# Patient Record
Sex: MALE | Race: BLACK OR AFRICAN AMERICAN | Employment: FULL TIME | URBAN - METROPOLITAN AREA
[De-identification: names, ages, dates, MRNs, and addresses within clinical notes are randomized per-mention and may not be internally consistent; named-entity substitution may affect disease eponyms.]

---

## 2017-01-24 LAB
ALBUMIN SERPL-MCNC: 3.6 G/DL (ref 3.5–5.5)
ALBUMIN/CREAT UR: 111 MG/G CREAT (ref 0–30)
ALBUMIN/GLOB SERPL: 1.3 {RATIO} (ref 1.1–2.5)
ALP SERPL-CCNC: 74 IU/L (ref 39–117)
ALT SERPL-CCNC: 14 IU/L (ref 0–44)
AST SERPL-CCNC: 15 IU/L (ref 0–40)
BILIRUB SERPL-MCNC: 0.4 MG/DL (ref 0–1.2)
BUN SERPL-MCNC: 16 MG/DL (ref 6–24)
BUN/CREAT SERPL: 16 (ref 9–20)
CALCIUM SERPL-MCNC: 8.5 MG/DL (ref 8.7–10.2)
CHLORIDE SERPL-SCNC: 104 MMOL/L (ref 96–106)
CHOLEST SERPL-MCNC: 151 MG/DL (ref 100–199)
CO2 SERPL-SCNC: 25 MMOL/L (ref 18–29)
COMMENT: ABNORMAL
CREAT SERPL-MCNC: 1 MG/DL (ref 0.76–1.27)
CREAT UR-MCNC: 187.9 MG/DL
EST. AVERAGE GLUCOSE BLD GHB EST-MCNC: 194 MG/DL
FSH SERPL-ACNC: 1.8 MIU/ML (ref 1.5–12.4)
GLOBULIN SER CALC-MCNC: 2.8 G/DL (ref 1.5–4.5)
GLUCOSE SERPL-MCNC: 115 MG/DL (ref 65–99)
HBA1C MFR BLD: 8.4 % (ref 4.8–5.6)
HDLC SERPL-MCNC: 33 MG/DL
INTERPRETATION, 910389: NORMAL
LDLC SERPL CALC-MCNC: 102 MG/DL (ref 0–99)
LH SERPL-ACNC: 2.4 MIU/ML (ref 1.7–8.6)
MICROALBUMIN UR-MCNC: 208.6 UG/ML
POTASSIUM SERPL-SCNC: 4 MMOL/L (ref 3.5–5.2)
PROT SERPL-MCNC: 6.4 G/DL (ref 6–8.5)
SODIUM SERPL-SCNC: 143 MMOL/L (ref 134–144)
TESTOST SERPL-MCNC: 156 NG/DL (ref 348–1197)
TRIGL SERPL-MCNC: 82 MG/DL (ref 0–149)
TSH SERPL DL<=0.005 MIU/L-ACNC: 1.06 UIU/ML (ref 0.45–4.5)
VLDLC SERPL CALC-MCNC: 16 MG/DL (ref 5–40)

## 2017-01-27 ENCOUNTER — OFFICE VISIT (OUTPATIENT)
Dept: ENDOCRINOLOGY | Age: 47
End: 2017-01-27

## 2017-01-27 VITALS
HEIGHT: 73 IN | HEART RATE: 79 BPM | SYSTOLIC BLOOD PRESSURE: 140 MMHG | TEMPERATURE: 96.9 F | RESPIRATION RATE: 18 BRPM | DIASTOLIC BLOOD PRESSURE: 78 MMHG | BODY MASS INDEX: 41.75 KG/M2 | WEIGHT: 315 LBS

## 2017-01-27 DIAGNOSIS — Z79.4 TYPE 2 DIABETES MELLITUS WITH HYPERGLYCEMIA, WITH LONG-TERM CURRENT USE OF INSULIN (HCC): Primary | ICD-10-CM

## 2017-01-27 DIAGNOSIS — E78.5 HYPERLIPIDEMIA, UNSPECIFIED HYPERLIPIDEMIA TYPE: ICD-10-CM

## 2017-01-27 DIAGNOSIS — Z79.4 UNCONTROLLED TYPE 2 DIABETES MELLITUS WITH HYPERGLYCEMIA, WITH LONG-TERM CURRENT USE OF INSULIN (HCC): ICD-10-CM

## 2017-01-27 DIAGNOSIS — I10 ESSENTIAL HYPERTENSION: ICD-10-CM

## 2017-01-27 DIAGNOSIS — E11.65 TYPE 2 DIABETES MELLITUS WITH HYPERGLYCEMIA, WITH LONG-TERM CURRENT USE OF INSULIN (HCC): Primary | ICD-10-CM

## 2017-01-27 DIAGNOSIS — E23.0 HYPOGONADOTROPIC HYPOGONADISM IN MALE (HCC): ICD-10-CM

## 2017-01-27 DIAGNOSIS — E11.65 UNCONTROLLED TYPE 2 DIABETES MELLITUS WITH HYPERGLYCEMIA, WITH LONG-TERM CURRENT USE OF INSULIN (HCC): ICD-10-CM

## 2017-01-27 DIAGNOSIS — E78.2 MIXED HYPERLIPIDEMIA: ICD-10-CM

## 2017-01-27 RX ORDER — ALBUTEROL SULFATE 90 UG/1
AEROSOL, METERED RESPIRATORY (INHALATION)
Refills: 0 | COMMUNITY
Start: 2017-01-06 | End: 2020-06-18

## 2017-01-27 RX ORDER — INSULIN GLARGINE 100 [IU]/ML
INJECTION, SOLUTION SUBCUTANEOUS
Qty: 75 ML | Refills: 3 | Status: SHIPPED | OUTPATIENT
Start: 2017-01-27 | End: 2018-04-25 | Stop reason: ALTCHOICE

## 2017-01-27 RX ORDER — LISINOPRIL AND HYDROCHLOROTHIAZIDE 20; 25 MG/1; MG/1
1 TABLET ORAL DAILY
Qty: 90 TAB | Refills: 3 | Status: SHIPPED | OUTPATIENT
Start: 2017-01-27 | End: 2018-04-26 | Stop reason: SDUPTHER

## 2017-01-27 NOTE — PATIENT INSTRUCTIONS
Check blood sugars before breakfast,dinner and at bedtime. If the bedtime sugars are less than 100 ,eat a 15 gm snack. Weight and diet control.     Metformin  mg BID     Lantus insulin 80 units at bed time    trulicity 1.5 mg       Additional Humalog  with meals if blood sugars are[de-identified]     150-200 mg   3 units   201-250 mg   6 units   251-300 mg   9 units   301-350 mg   12 units   351-400 mg   15 units

## 2017-01-27 NOTE — PROGRESS NOTES
Isabela Damian is a 48 y.o. male here for   Chief Complaint   Patient presents with    Diabetes     3 mo f/u    Cholesterol Problem     3 mo f/u    Hypertension     3 mo f/u       Functional glucose monitor and record keeping system? - yes  Eye exam within last year? - yes, yesterday  Foot exam within last year? - yes    Lab Results   Component Value Date/Time    Hemoglobin A1c 8.4 01/23/2017 09:57 AM    Hemoglobin A1c (POC) 8.8 10/21/2016 11:00 AM       Wt Readings from Last 3 Encounters:   10/21/16 338 lb 3.2 oz (153.4 kg)   06/30/16 331 lb 9.6 oz (150.4 kg)   03/30/16 320 lb (145.2 kg)     Temp Readings from Last 3 Encounters:   10/21/16 98.5 °F (36.9 °C) (Oral)   06/30/16 97.3 °F (36.3 °C) (Oral)   03/30/16 98.6 °F (37 °C) (Oral)     BP Readings from Last 3 Encounters:   10/21/16 128/68   06/30/16 135/76   03/30/16 126/86     Pulse Readings from Last 3 Encounters:   10/21/16 81   06/30/16 83   03/30/16 92

## 2017-01-27 NOTE — MR AVS SNAPSHOT
Visit Information Date & Time Provider Department Dept. Phone Encounter #  
 1/27/2017  9:30 AM Geoffrey Nassar MD [facility] [phone] [id number] Follow-up Instructions Return in about 3 months (around 4/27/2017). Upcoming Health Maintenance Date Due  
 FOOT EXAM Q1 10/9/1978 EYE EXAM RETINAL OR DILATED Q1 10/9/1978 Pneumococcal 19-64 Medium Risk (1 of 1 - PPSV23) 10/9/1987 DTaP/Tdap/Td series (1 - Tdap) 10/9/1989 INFLUENZA AGE 9 TO ADULT 8/1/2016 HEMOGLOBIN A1C Q6M 7/23/2017 MICROALBUMIN Q1 1/23/2018 LIPID PANEL Q1 1/23/2018 Allergies as of 1/27/2017  Review Complete On: 1/27/2017 By: Geoffrey Nassar MD  
  
 Severity Noted Reaction Type Reactions Shellfish Derived  07/15/2014    Rash Current Immunizations  Never Reviewed No immunizations on file. Not reviewed this visit You Were Diagnosed With   
  
 Codes Comments Type 2 diabetes mellitus with hyperglycemia, with long-term current use of insulin (HCC)    -  Primary ICD-10-CM: E11.65, Z79.4 ICD-9-CM: 250.00, 790.29, V58.67 Mixed hyperlipidemia     ICD-10-CM: E78.2 ICD-9-CM: 272.2 Essential hypertension     ICD-10-CM: I10 
ICD-9-CM: 401.9 Vitals BP Pulse Temp Resp Height(growth percentile) Weight(growth percentile) 140/78 (BP 1 Location: Left arm, BP Patient Position: Sitting) 79 96.9 °F (36.1 °C) (Oral) 18 6' 1\" (1.854 m) 346 lb (156.9 kg) BMI Smoking Status 45.65 kg/m2 Former Smoker Vitals History BMI and BSA Data Body Mass Index Body Surface Area  
 45.65 kg/m 2 2.84 m 2 Preferred Pharmacy Pharmacy Name Phone [address] [facility], [facility] Fidelia Nassar [phone] Your Updated Medication List  
  
   
This list is accurate as of: 1/27/17  9:42 AM.  Always use your most recent med list.  
  
  
  
  
 atorvastatin 10 mg tablet Commonly known as:  LIPITOR Take 1 Tab by mouth nightly. dulaglutide 0.75 mg/0.5 mL sub-q pen Commonly known as:  TRULICITY  
0.5 mL by SubCUTAneous route every seven (7) days. glucose blood VI test strips strip Commonly known as:  Ascensia CONTOUR Test Blood Glucose 3 Times Daily * insulin glargine 100 unit/mL (3 mL) pen Commonly known as:  LANTUS SOLOSTAR Lantus insulin 60 units at bed time * insulin glargine 300 unit/mL (1.5 mL) Inpn Commonly known as:  TOUJEO SOLOSTAR  
60 Units by SubCUTAneous route nightly. insulin lispro 100 unit/mL kwikpen Commonly known as:  HumaLOG KwikPen Inject 15 units breakfast and 15  units before dinner w/SSI. Max Daily Units: 75 Insulin Needles (Disposable) 32 gauge x 5/32\" Ndle Commonly known as:  Radha Pen Needle 3 x daily Lancets Misc Use to test blood glucose 3 times daily  
  
 lisinopril-hydroCHLOROthiazide 20-25 mg per tablet Commonly known as:  Diane Hodgkin Take 1 Tab by mouth daily. metFORMIN  mg tablet Commonly known as:  GLUCOPHAGE XR Take 1 Tab by mouth two (2) times daily (with meals). NAPROSYN 500 mg tablet Generic drug:  naproxen Take 500 mg by mouth two (2) times daily (with meals). PROAIR HFA 90 mcg/actuation inhaler Generic drug:  albuterol  
inhale 2 puffs by mouth every 4 hours * Notice: This list has 2 medication(s) that are the same as other medications prescribed for you. Read the directions carefully, and ask your doctor or other care provider to review them with you. Follow-up Instructions Return in about 3 months (around 4/27/2017). Patient Instructions Check blood sugars before breakfast,dinner and at bedtime. If the bedtime sugars are less than 100 ,eat a 15 gm snack. Weight and diet control. Metformin  mg BID Lantus insulin 80 units at bed time 
 
trulicity 1.5 mg  
 
 
 Additional Humalog  with meals if blood sugars are[de-identified]  
 
150-200 mg   3 units 201-250 mg   6 units 251-300 mg   9 units 301-350 mg   12 units 351-400 mg   15 units Introducing [facility]! Marjan Pham introduces Layar patient portal. Now you can access parts of your medical record, email your doctor's office, and request medication refills online. 1. In your internet browser, go to [email or web address] 2. Click on the First Time User? Click Here link in the Sign In box. You will see the New Member Sign Up page. 3. Enter your Layar Access Code exactly as it appears below. You will not need to use this code after youve completed the sign-up process. If you do not sign up before the expiration date, you must request a new code. · Layar Access Code: [email or web address] Expires: 4/27/2017  9:42 AM 
 
4. Enter the last four digits of your Social Security Number (xxxx) and Date of Birth (mm/dd/yyyy) as indicated and click Submit. You will be taken to the next sign-up page. 5. Create a Layar ID. This will be your Layar login ID and cannot be changed, so think of one that is secure and easy to remember. 6. Create a Layar password. You can change your password at any time. 7. Enter your Password Reset Question and Answer. This can be used at a later time if you forget your password. 8. Enter your e-mail address. You will receive e-mail notification when new information is available in [address] 9. Click Sign Up. You can now view and download portions of your medical record. 10. Click the Download Summary menu link to download a portable copy of your medical information. If you have questions, please visit the Frequently Asked Questions section of the Layar website. Remember, Layar is NOT to be used for urgent needs. For medical emergencies, dial 911. Now available from your iPhone and Android! Please provide this summary of care documentation to your next provider. Your primary care clinician is listed as Tasia Rosario. If you have any questions after today's visit, please call [phone].

## 2017-01-27 NOTE — PROGRESS NOTES
[facility]               Lang Barrett MD        [address] [phone] Fax [phone] ( [facility]) [address] [address] [address] [phone] Fax [phone] ( Monday)          Patient Information  Date:1/28/2017  Name : Nayely Browne 48 y.o.     YOB: 1970         Referred by: hSelli Lauren NP         Chief Complaint   Patient presents with    Diabetes     3 mo f/u    Cholesterol Problem     3 mo f/u    Hypertension     3 mo f/u       History of Present Illness: Nayely Browne is a 48 y.o. male here for follow-up    Type 2 DM on insulin   not taking Humalog 5 times a week      working nights  Less sleep   No log      missing prandial insulin      Day shift - 7 A - 7 PM - 8 AM breakfast , 12 noon lunch , dinner 6 pm  Night shifts -   2 PM - 6 pm - 12 AM     Type 2 diabetes was diagnosed 14 years ago  Hypoglycemia:  None. History of hypertension:  On Benicar/HCT. No chest pain, claudication, shortness of breath. Wt Readings from Last 3 Encounters:   01/27/17 346 lb (156.9 kg)   10/21/16 338 lb 3.2 oz (153.4 kg)   06/30/16 331 lb 9.6 oz (150.4 kg)       BP Readings from Last 3 Encounters:   01/27/17 140/78   10/21/16 128/68   06/30/16 135/76           Past Medical History   Diagnosis Date    HTN (hypertension)     Hyperlipidemia     Type II or unspecified type diabetes mellitus without mention of complication, uncontrolled      Current Outpatient Prescriptions   Medication Sig    PROAIR HFA 90 mcg/actuation inhaler inhale 2 puffs by mouth every 4 hours    metFORMIN ER (GLUCOPHAGE XR) 500 mg tablet Take 1 Tab by mouth two (2) times daily (with meals).  insulin lispro (HUMALOG KWIKPEN) 100 unit/mL kwikpen Inject 15 units breakfast and 15  units before dinner w/SSI.  Max Daily Units: 75 (Patient taking differently: Inject 20 units breakfast and 20  units before dinner w/SSI and 5 units for snacks. Max Daily Units: 75)    atorvastatin (LIPITOR) 10 mg tablet Take 1 Tab by mouth nightly.  Insulin Needles, Disposable, (SAFIA PEN NEEDLE) 32 x 5/32 \" ndle 3 x daily    Lancets misc Use to test blood glucose 3 times daily    glucose blood VI test strips (ASCENSIA CONTOUR) strip Test Blood Glucose 3 Times Daily    dulaglutide (TRULICITY) 1.5 LS/8.5 mL sub-q pen 0.5 mL by SubCUTAneous route every seven (7) days.  lisinopril-hydroCHLOROthiazide (PRINZIDE, ZESTORETIC) 20-25 mg per tablet Take 1 Tab by mouth daily.  insulin glargine (LANTUS SOLOSTAR) 100 unit/mL (3 mL) pen Inject 80 units at bed time    naproxen (NAPROSYN) 500 mg tablet Take 500 mg by mouth two (2) times daily (with meals). No current facility-administered medications for this visit. Allergies   Allergen Reactions    Shellfish Derived Rash         Review of Systems:  -   - Eyes: no blurry vision no double vision  - Cardiovascular: no chest pain ,no palpitations  - Respiratory: no cough no shortness of breath  - Gastrointestinal: no dysphagia no  abdominal pain  - Musculoskeletal: no joint pains no  weakness  - Integumentary: no rashes  - Neurological: no numbness, tingling, no  headaches  -     Physical Examination:   Blood pressure 140/78, pulse 79, temperature 96.9 °F (36.1 °C), temperature source Oral, resp. rate 18, height 6' 1\" (1.854 m), weight 346 lb (156.9 kg). Estimated body mass index is 45.65 kg/(m^2) as calculated from the following:    Height as of this encounter: 6' 1\" (1.854 m). -   Weight as of this encounter: 346 lb (156.9 kg).   - General: pleasant, no distress, good eye contact  - HEENT: no pallor, no periorbital edema, EOMI  - Neck: supple, no thyromegaly  - Cardiovascular: regular, normal rate, normal S1 and S2  - Respiratory: clear to auscultation bilaterally  - Gastrointestinal: soft, nontender, nondistended,  BS +  - Musculoskeletal: + edema, callus , left hammer toe   - Neurological: alert and oriented  - Psychiatric: normal mood and affect  - Skin: color, texture, turgor normal.       Data Reviewed:     [x] Glucose records reviewed. [] See glucose records for details (to be scanned). [] A1C  [x] Reviewed labs    Lab Results   Component Value Date/Time    Hemoglobin A1c 8.4 01/23/2017 09:57 AM    Hemoglobin A1c 9.3 03/08/2016 09:59 AM    Hemoglobin A1c 9.0 08/27/2015 09:39 AM    Glucose 115 01/23/2017 09:57 AM    Glucose  10/21/2016 11:00 AM    Microalb/Creat ratio (ug/mg creat.) 111.0 01/23/2017 09:57 AM    LDL, calculated 102 01/23/2017 09:57 AM    Creatinine 1.00 01/23/2017 09:57 AM      Lab Results   Component Value Date/Time    Cholesterol, total 151 01/23/2017 09:57 AM    HDL Cholesterol 33 01/23/2017 09:57 AM    LDL, calculated 102 01/23/2017 09:57 AM    Triglyceride 82 01/23/2017 09:57 AM     Lab Results   Component Value Date/Time    ALT 14 01/23/2017 09:57 AM    AST 15 01/23/2017 09:57 AM    Alk. phosphatase 74 01/23/2017 09:57 AM    Bilirubin, total 0.4 01/23/2017 09:57 AM     Lab Results   Component Value Date/Time    GFR est  01/23/2017 09:57 AM    GFR est non-AA 89 01/23/2017 09:57 AM    Creatinine 1.00 01/23/2017 09:57 AM    BUN 16 01/23/2017 09:57 AM    Sodium 143 01/23/2017 09:57 AM    Potassium 4.0 01/23/2017 09:57 AM    Chloride 104 01/23/2017 09:57 AM    CO2 25 01/23/2017 09:57 AM      Lab Results   Component Value Date/Time    TSH 1.060 01/23/2017 09:57 AM        Assessment/Plan:     1. Type 2 Diabetes Mellitus with neuropathy,uncontrolled  Metformin  Trulicity    Lantus 80 units at bedtime. Hold Humalog  20 units BID , discussed to take before each meal , 5 units for snacks   Advised to check glucose 3 - 4 times daily  FLU annually ,Pneumovax ,aspirin daily,annual eye exam,microalbumin    2. HTN : Continue current therapy    3. Hyperlipidemia : lifestyle changes discussed. If no improvement statins    4. Obesity:Body mass index is 45.65 kg/(m^2).    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 If the bedtime sugars are less than 100 ,eat a 15 gm snack. Weight and diet control. Metformin  mg BID     Lantus insulin 80 units at bed time    trulicity 1.5 mg       Additional Humalog  with meals if blood sugars are[de-identified]     150-200 mg   3 units   201-250 mg   6 units   251-300 mg   9 units   301-350 mg   12 units   351-400 mg   15 units           Follow-up Disposition:  Return in about 3 months (around 4/27/2017). Thank you for allowing me to participate in the care of this patient. Will Rutledge MD             [address] [facility]               Will Rutledge MD        [address] [address] [phone] Fax [phone] ( [facility])        [address] [facility] [address] [phone] Fax [phone] ( Monday)          Patient Information  Date:1/28/2017  Name : Rosenda Crews 48 y.o.     YOB: 1970         Referred by: Tasia Rosario NP         Chief Complaint   Patient presents with    Diabetes     3 mo f/u    Cholesterol Problem     3 mo f/u    Hypertension     3 mo f/u       History of Present Illness: Rosenda Crews is a 48 y.o. male here for follow-up    Type 2 DM on insulin   Insulin is expensive   working nights  Less sleep   No log      missing prandial insulin      Day shift - 7 A - 7 PM - 8 AM breakfast , 12 noon lunch , dinner 6 pm  Night shifts -   2 PM - 6 pm - 12 AM     Type 2 diabetes was diagnosed 14 years ago  Hypoglycemia:  None. History of hypertension:  On Benicar/HCT. No chest pain, claudication, shortness of breath.           Wt Readings from Last 3 Encounters:   01/27/17 346 lb (156.9 kg)   10/21/16 338 lb 3.2 oz (153.4 kg)   06/30/16 331 lb 9.6 oz (150.4 kg)       BP Readings from Last 3 Encounters:   01/27/17 140/78   10/21/16 128/68   06/30/16 135/76           Past Medical History   Diagnosis Date    HTN (hypertension)     Hyperlipidemia     Type II or unspecified type diabetes mellitus without mention of complication, uncontrolled      Current Outpatient Prescriptions   Medication Sig    PROAIR HFA 90 mcg/actuation inhaler inhale 2 puffs by mouth every 4 hours    metFORMIN ER (GLUCOPHAGE XR) 500 mg tablet Take 1 Tab by mouth two (2) times daily (with meals).  insulin lispro (HUMALOG KWIKPEN) 100 unit/mL kwikpen Inject 15 units breakfast and 15  units before dinner w/SSI. Max Daily Units: 75 (Patient taking differently: Inject 20 units breakfast and 20  units before dinner w/SSI and 5 units for snacks. Max Daily Units: 75)    atorvastatin (LIPITOR) 10 mg tablet Take 1 Tab by mouth nightly.  Insulin Needles, Disposable, (SAFIA PEN NEEDLE) 32 x 5/32 \" ndle 3 x daily    Lancets misc Use to test blood glucose 3 times daily    glucose blood VI test strips (ASCENSIA CONTOUR) strip Test Blood Glucose 3 Times Daily    dulaglutide (TRULICITY) 1.5 KQ/4.2 mL sub-q pen 0.5 mL by SubCUTAneous route every seven (7) days.  lisinopril-hydroCHLOROthiazide (PRINZIDE, ZESTORETIC) 20-25 mg per tablet Take 1 Tab by mouth daily.  insulin glargine (LANTUS SOLOSTAR) 100 unit/mL (3 mL) pen Inject 80 units at bed time    naproxen (NAPROSYN) 500 mg tablet Take 500 mg by mouth two (2) times daily (with meals). No current facility-administered medications for this visit.       Allergies   Allergen Reactions    Shellfish Derived Rash         Review of Systems:  -   - Eyes: no blurry vision no double vision  - Cardiovascular: no chest pain ,no palpitations  - Respiratory: no cough no shortness of breath  - Gastrointestinal: no dysphagia no  abdominal pain  - Musculoskeletal: no joint pains no  weakness  - Integumentary: no rashes  - Neurological: no numbness, tingling, no  headaches  -     Physical Examination:   Blood pressure 140/78, pulse 79, temperature 96.9 °F (36.1 °C), temperature source Oral, resp. rate 18, height 6' 1\" (1.854 m), weight 346 lb (156.9 kg). Estimated body mass index is 45.65 kg/(m^2) as calculated from the following:    Height as of this encounter: 6' 1\" (1.854 m). -   Weight as of this encounter: 346 lb (156.9 kg). - General: pleasant, no distress, good eye contact  - HEENT: no pallor, no periorbital edema, EOMI  - Neck: supple, no thyromegaly  - Cardiovascular: regular, normal rate, normal S1 and S2  - Respiratory: clear to auscultation bilaterally  - Gastrointestinal: soft, nontender, nondistended,  BS +  - Musculoskeletal: + edema, callus , left hammer toe   - Neurological: alert and oriented  - Psychiatric: normal mood and affect  - Skin: color, texture, turgor normal.       Data Reviewed:     [x] Glucose records reviewed. [] See glucose records for details (to be scanned). [] A1C  [x] Reviewed labs    Lab Results   Component Value Date/Time    Hemoglobin A1c 8.4 01/23/2017 09:57 AM    Hemoglobin A1c 9.3 03/08/2016 09:59 AM    Hemoglobin A1c 9.0 08/27/2015 09:39 AM    Glucose 115 01/23/2017 09:57 AM    Glucose  10/21/2016 11:00 AM    Microalb/Creat ratio (ug/mg creat.) 111.0 01/23/2017 09:57 AM    LDL, calculated 102 01/23/2017 09:57 AM    Creatinine 1.00 01/23/2017 09:57 AM      Lab Results   Component Value Date/Time    Cholesterol, total 151 01/23/2017 09:57 AM    HDL Cholesterol 33 01/23/2017 09:57 AM    LDL, calculated 102 01/23/2017 09:57 AM    Triglyceride 82 01/23/2017 09:57 AM     Lab Results   Component Value Date/Time    ALT 14 01/23/2017 09:57 AM    AST 15 01/23/2017 09:57 AM    Alk.  phosphatase 74 01/23/2017 09:57 AM    Bilirubin, total 0.4 01/23/2017 09:57 AM     Lab Results   Component Value Date/Time    GFR est  01/23/2017 09:57 AM    GFR est non-AA 89 01/23/2017 09:57 AM    Creatinine 1.00 01/23/2017 09:57 AM    BUN 16 01/23/2017 09:57 AM    Sodium 143 01/23/2017 09:57 AM    Potassium 4.0 01/23/2017 09:57 AM    Chloride 104 01/23/2017 09:57 AM    CO2 25 01/23/2017 09:57 AM      Lab Results   Component Value Date/Time    TSH 1.060 01/23/2017 09:57 AM        Assessment/Plan:     1. Type 2 Diabetes Mellitus with neuropathy,uncontrolled  Metformin  Trulicity    Lantus 70 units at bedtime. Humalog  20 units BID , discussed to take before each meal , 5 units for snacks   Advised to check glucose 3 - 4 times daily  FLU annually ,Pneumovax ,aspirin daily,annual eye exam,microalbumin    2. HTN : Continue current therapy    3. Hyperlipidemia : lifestyle changes discussed. If no improvement statins    4. Obesity:Body mass index is 45.65 kg/(m^2). Diet discussed    5 ED - cialis , labs         Patient Instructions   Check blood sugars before breakfast,dinner and at bedtime. If the bedtime sugars are less than 100 ,eat a 15 gm snack. Weight and diet control. Metformin  mg BID     Lantus insulin 80 units at bed time    trulicity 1.5 mg       Additional Humalog  with meals if blood sugars are[de-identified]     150-200 mg   3 units   201-250 mg   6 units   251-300 mg   9 units   301-350 mg   12 units   351-400 mg   15 units           Follow-up Disposition:  Return in about 3 months (around 4/27/2017). Thank you for allowing me to participate in the care of this patient.     Devaughn Lala MD

## 2017-01-28 PROBLEM — E23.0 HYPOGONADOTROPIC HYPOGONADISM IN MALE (HCC): Status: ACTIVE | Noted: 2017-01-28

## 2018-03-02 RX ORDER — DULAGLUTIDE 1.5 MG/.5ML
INJECTION, SOLUTION SUBCUTANEOUS
Qty: 12 SYRINGE | Refills: 2 | Status: SHIPPED | OUTPATIENT
Start: 2018-03-02 | End: 2018-09-23 | Stop reason: SDUPTHER

## 2018-04-13 ENCOUNTER — TELEPHONE (OUTPATIENT)
Dept: ENDOCRINOLOGY | Age: 48
End: 2018-04-13

## 2018-04-13 DIAGNOSIS — E11.65 TYPE 2 DIABETES MELLITUS WITH HYPERGLYCEMIA, UNSPECIFIED WHETHER LONG TERM INSULIN USE (HCC): ICD-10-CM

## 2018-04-13 DIAGNOSIS — E29.1 HYPOGONADISM IN MALE: Primary | ICD-10-CM

## 2018-04-24 ENCOUNTER — OFFICE VISIT (OUTPATIENT)
Dept: ENDOCRINOLOGY | Age: 48
End: 2018-04-24

## 2018-04-24 VITALS
OXYGEN SATURATION: 97 % | TEMPERATURE: 97.1 F | RESPIRATION RATE: 18 BRPM | HEIGHT: 73 IN | DIASTOLIC BLOOD PRESSURE: 72 MMHG | SYSTOLIC BLOOD PRESSURE: 140 MMHG | BODY MASS INDEX: 41.75 KG/M2 | WEIGHT: 315 LBS | HEART RATE: 85 BPM

## 2018-04-24 DIAGNOSIS — E11.65 TYPE 2 DIABETES MELLITUS WITH HYPERGLYCEMIA, WITH LONG-TERM CURRENT USE OF INSULIN (HCC): Primary | ICD-10-CM

## 2018-04-24 DIAGNOSIS — Z79.4 TYPE 2 DIABETES MELLITUS WITH HYPERGLYCEMIA, WITH LONG-TERM CURRENT USE OF INSULIN (HCC): Primary | ICD-10-CM

## 2018-04-24 DIAGNOSIS — I10 ESSENTIAL HYPERTENSION: ICD-10-CM

## 2018-04-24 DIAGNOSIS — E78.2 MIXED HYPERLIPIDEMIA: ICD-10-CM

## 2018-04-24 DIAGNOSIS — E23.0 HYPOGONADOTROPIC HYPOGONADISM IN MALE (HCC): ICD-10-CM

## 2018-04-24 PROBLEM — E11.21 TYPE 2 DIABETES WITH NEPHROPATHY (HCC): Status: ACTIVE | Noted: 2018-04-24

## 2018-04-24 RX ORDER — DICLOFENAC SODIUM 75 MG/1
75 TABLET, DELAYED RELEASE ORAL AS NEEDED
COMMUNITY
End: 2020-06-18

## 2018-04-24 NOTE — PROGRESS NOTES
Ira Poole is a 50 y.o. male here for   Chief Complaint   Patient presents with    Diabetes       Functional glucose monitor and record keeping system? - no  Eye exam within last year? - no, need referral  Foot exam within last year? - due    1. Have you been to the ER, urgent care clinic since your last visit? Hospitalized since your last visit? -no    2. Have you seen or consulted any other health care providers outside of the [address] [place] [address] since your last visit?   Include any pap smears or colon screening.-no      Lab Results   Component Value Date/Time    Hemoglobin A1c 7.0 (H) 04/17/2018 09:06 AM    Hemoglobin A1c (POC) 8.8 10/21/2016 11:00 AM       Wt Readings from Last 3 Encounters:   01/27/17 346 lb (156.9 kg)   10/21/16 338 lb 3.2 oz (153.4 kg)   06/30/16 331 lb 9.6 oz (150.4 kg)     Temp Readings from Last 3 Encounters:   01/27/17 96.9 °F (36.1 °C) (Oral)   10/21/16 98.5 °F (36.9 °C) (Oral)   06/30/16 97.3 °F (36.3 °C) (Oral)     BP Readings from Last 3 Encounters:   01/27/17 140/78   10/21/16 128/68   06/30/16 135/76     Pulse Readings from Last 3 Encounters:   01/27/17 79   10/21/16 81   06/30/16 83

## 2018-04-24 NOTE — PROGRESS NOTES
[facility]               Tashia Pennington MD        [address] 78 [phone] Fax [phone] ( FJB-OWN)        [address] [address] [address] [id number] Fax [phone] ( Monday)          Patient Information  Date:4/24/2018  Name : Gildardo Sanchez 50 y.o.     YOB: 1970         Referred by: Gianni Smith NP         Chief Complaint   Patient presents with    Diabetes       History of Present Illness: Gildardo Sanchez is a 50 y.o. male here for follow-up    Type 2 DM on insulin   not taking Humalog 5 times a week      working nights  Less sleep   No log      missing prandial insulin      Day shift - 7 A - 7 PM - 8 AM breakfast , 12 noon lunch , dinner 6 pm  Night shifts -   2 PM - 6 pm - 12 AM     Type 2 diabetes was diagnosed 14 years ago  Hypoglycemia:  None. History of hypertension:  On Benicar/HCT. No chest pain, claudication, shortness of breath. Wt Readings from Last 3 Encounters:   04/24/18 338 lb 9.6 oz (153.6 kg)   01/27/17 346 lb (156.9 kg)   10/21/16 338 lb 3.2 oz (153.4 kg)       BP Readings from Last 3 Encounters:   04/24/18 140/72   01/27/17 140/78   10/21/16 128/68           Past Medical History:   Diagnosis Date    HTN (hypertension)     Hyperlipidemia     Type II or unspecified type diabetes mellitus without mention of complication, uncontrolled      Current Outpatient Prescriptions   Medication Sig    diclofenac EC (VOLTAREN) 75 mg EC tablet Take 75 mg by mouth as needed.  TRULICITY 1.5 JV/4.2 mL sub-q pen INJECT 0.5ML SUBCUTANEOUSLY EVERY 7 DAYS    lisinopril-hydroCHLOROthiazide (PRINZIDE, ZESTORETIC) 20-25 mg per tablet Take 1 Tab by mouth daily.     insulin glargine (LANTUS SOLOSTAR) 100 unit/mL (3 mL) pen Inject 80 units at bed time    Insulin Needles, Disposable, (SAFIA PEN NEEDLE) 32 x 5/32 \" ndle 3 x daily    Lancets misc Use to test blood glucose 3 times daily    PROAIR HFA 90 mcg/actuation inhaler inhale 2 puffs by mouth every 4 hours    metFORMIN ER (GLUCOPHAGE XR) 500 mg tablet Take 1 Tab by mouth two (2) times daily (with meals).  insulin lispro (HUMALOG KWIKPEN) 100 unit/mL kwikpen Inject 15 units breakfast and 15  units before dinner w/SSI. Max Daily Units: 75 (Patient taking differently: Inject 20 units breakfast and 20  units before dinner w/SSI and 5 units for snacks. Max Daily Units: 75)    atorvastatin (LIPITOR) 10 mg tablet Take 1 Tab by mouth nightly.  glucose blood VI test strips (Exercise.comIA CONTOUR) strip Test Blood Glucose 3 Times Daily    naproxen (NAPROSYN) 500 mg tablet Take 500 mg by mouth two (2) times daily (with meals). No current facility-administered medications for this visit. Allergies   Allergen Reactions    Shellfish Derived Rash         Review of Systems:  -   - Eyes: no blurry vision no double vision  - Cardiovascular: no chest pain ,no palpitations  - Respiratory: no cough no shortness of breath  - Gastrointestinal: no dysphagia no  abdominal pain  - Musculoskeletal: no joint pains no  weakness  - Integumentary: no rashes  - Neurological: no numbness, tingling, no  headaches  -     Physical Examination:   Blood pressure 140/72, pulse 85, temperature 97.1 °F (36.2 °C), temperature source Oral, resp. rate 18, height 6' 1\" (1.854 m), weight 338 lb 9.6 oz (153.6 kg), SpO2 97 %. Estimated body mass index is 44.67 kg/(m^2) as calculated from the following:    Height as of this encounter: 6' 1\" (1.854 m). -   Weight as of this encounter: 338 lb 9.6 oz (153.6 kg).   - General: pleasant, no distress, good eye contact  - HEENT: no pallor, no periorbital edema, EOMI  - Neck: supple, no thyromegaly  - Cardiovascular: regular, normal rate, normal S1 and S2  - Respiratory: clear to auscultation bilaterally  - Gastrointestinal: soft, nontender, nondistended,  BS +  - Musculoskeletal: + edema, callus , left hammer toe   - Neurological: alert and oriented  - Psychiatric: normal mood and affect  - Skin: color, texture, turgor normal.       Data Reviewed:     [x] Glucose records reviewed. [] See glucose records for details (to be scanned). [] A1C  [x] Reviewed labs    Lab Results   Component Value Date/Time    Hemoglobin A1c 7.0 (H) 04/17/2018 09:06 AM    Hemoglobin A1c 8.4 (H) 01/23/2017 09:57 AM    Hemoglobin A1c 9.3 (H) 03/08/2016 09:59 AM    Glucose 98 04/17/2018 09:06 AM    Glucose  10/21/2016 11:00 AM    Microalb/Creat ratio (ug/mg creat.) 170.0 (H) 04/17/2018 09:06 AM    LDL, calculated 109 (H) 04/17/2018 09:06 AM    Creatinine 1.01 04/17/2018 09:06 AM      Lab Results   Component Value Date/Time    Cholesterol, total 164 04/17/2018 09:06 AM    HDL Cholesterol 35 (L) 04/17/2018 09:06 AM    LDL, calculated 109 (H) 04/17/2018 09:06 AM    Triglyceride 101 04/17/2018 09:06 AM     Lab Results   Component Value Date/Time    ALT (SGPT) 20 04/17/2018 09:06 AM    AST (SGOT) 22 04/17/2018 09:06 AM    Alk. phosphatase 65 04/17/2018 09:06 AM    Bilirubin, total 0.6 04/17/2018 09:06 AM     Lab Results   Component Value Date/Time    GFR est  04/17/2018 09:06 AM    GFR est non-AA 87 04/17/2018 09:06 AM    Creatinine 1.01 04/17/2018 09:06 AM    BUN 14 04/17/2018 09:06 AM    Sodium 141 04/17/2018 09:06 AM    Potassium 4.2 04/17/2018 09:06 AM    Chloride 104 04/17/2018 09:06 AM    CO2 26 04/17/2018 09:06 AM      Lab Results   Component Value Date/Time    TSH 1.060 01/23/2017 09:57 AM        Assessment/Plan:     1. Type 2 Diabetes Mellitus with neuropathy,uncontrolled  Metformin  Trulicity    Lantus 80 units at bedtime. Hold Humalog  20 units BID , discussed to take before each meal , 5 units for snacks   Advised to check glucose 3 - 4 times daily  FLU annually ,Pneumovax ,aspirin daily,annual eye exam,microalbumin    2. HTN : Continue current therapy    3. Hyperlipidemia : lifestyle changes discussed. If no improvement statins    4. Obesity:Body mass index is 44.67 kg/(m^2). 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 Follow-up Disposition: Not on File    Thank you for allowing me to participate in the care of this patient. Aleksandra Elias MD             [facility]               Aleksandra Elias MD        [address] [phone] Fax [phone] ( EFD-AFT)        [address] [facility] [address] [id number] Fax [phone] ( Monday)          Patient Information  Date:4/24/2018  Name : Anu Wilks 50 y.o.     YOB: 1970         Referred by: Elizabeth Kelly NP         Chief Complaint   Patient presents with    Diabetes       History of Present Illness: Anu Wilks is a 50 y.o. male here for follow-up    Type 2 DM on insulin  He is working nights. Has lost weight  A1c is improved  I do not have any log    Day shift - 7 A - 7 PM - 8 AM breakfast , 12 noon lunch , dinner 6 pm  Night shifts -   2 PM - 6 pm - 12 AM     Type 2 diabetes was diagnosed 14 years ago  Hypoglycemia:  None. No chest pain, claudication, shortness of breath. Wt Readings from Last 3 Encounters:   04/24/18 338 lb 9.6 oz (153.6 kg)   01/27/17 346 lb (156.9 kg)   10/21/16 338 lb 3.2 oz (153.4 kg)       BP Readings from Last 3 Encounters:   04/24/18 140/72   01/27/17 140/78   10/21/16 128/68           Past Medical History:   Diagnosis Date    HTN (hypertension)     Hyperlipidemia     Type II or unspecified type diabetes mellitus without mention of complication, uncontrolled      Current Outpatient Prescriptions   Medication Sig    diclofenac EC (VOLTAREN) 75 mg EC tablet Take 75 mg by mouth as needed.     TRULICITY 1.5 CR/7.6 mL sub-q pen INJECT 0.5ML SUBCUTANEOUSLY EVERY 7 DAYS    lisinopril-hydroCHLOROthiazide (Kelley Day) 20-25 mg per tablet Take 1 Tab by mouth daily.  insulin glargine (LANTUS SOLOSTAR) 100 unit/mL (3 mL) pen Inject 80 units at bed time    Insulin Needles, Disposable, (SAFIA PEN NEEDLE) 32 x 5/32 \" ndle 3 x daily    Lancets misc Use to test blood glucose 3 times daily    PROAIR HFA 90 mcg/actuation inhaler inhale 2 puffs by mouth every 4 hours    metFORMIN ER (GLUCOPHAGE XR) 500 mg tablet Take 1 Tab by mouth two (2) times daily (with meals).  insulin lispro (HUMALOG KWIKPEN) 100 unit/mL kwikpen Inject 15 units breakfast and 15  units before dinner w/SSI. Max Daily Units: 75 (Patient taking differently: Inject 20 units breakfast and 20  units before dinner w/SSI and 5 units for snacks. Max Daily Units: 75)    atorvastatin (LIPITOR) 10 mg tablet Take 1 Tab by mouth nightly.  glucose blood VI test strips (ASCENSIA CONTOUR) strip Test Blood Glucose 3 Times Daily    naproxen (NAPROSYN) 500 mg tablet Take 500 mg by mouth two (2) times daily (with meals). No current facility-administered medications for this visit. Allergies   Allergen Reactions    Shellfish Derived Rash         Review of Systems:  -   - Eyes: no blurry vision no double vision  - Cardiovascular: no chest pain ,no palpitations  - Respiratory: no cough no shortness of breath  - Gastrointestinal: no dysphagia no  abdominal pain  - Musculoskeletal: no joint pains no  weakness  - Integumentary: no rashes  - Neurological: no numbness, tingling, no  headaches  -     Physical Examination:   Blood pressure 140/72, pulse 85, temperature 97.1 °F (36.2 °C), temperature source Oral, resp. rate 18, height 6' 1\" (1.854 m), weight 338 lb 9.6 oz (153.6 kg), SpO2 97 %. Estimated body mass index is 44.67 kg/(m^2) as calculated from the following:    Height as of this encounter: 6' 1\" (1.854 m). -   Weight as of this encounter: 338 lb 9.6 oz (153.6 kg).   - General: pleasant, no distress, good eye contact  - HEENT: no pallor, no periorbital edema, EOMI  - Neck: supple, no thyromegaly  - Cardiovascular: regular, normal rate, normal S1 and S2  - Respiratory: clear to auscultation bilaterally  - Gastrointestinal: soft, nontender, nondistended,  BS +  - Musculoskeletal: No proximal muscle weakness  - Neurological: alert and oriented  - Psychiatric: normal mood and affect  - Skin: color, texture, turgor normal.     Diabetic foot exam: April 2018    Left:     Vibratory sensation absent   Filament test absent sensation with micro filament   Pulse DP: 1+    Deformities: Hammer toe  Right:    Vibratory sensation absent   Filament test absent sensation with micro filament   Pulse DP: 1+   Deformities: None      Data Reviewed:     [x] Glucose records reviewed. [] See glucose records for details (to be scanned). [] A1C  [x] Reviewed labs    Lab Results   Component Value Date/Time    Hemoglobin A1c 7.0 (H) 04/17/2018 09:06 AM    Hemoglobin A1c 8.4 (H) 01/23/2017 09:57 AM    Hemoglobin A1c 9.3 (H) 03/08/2016 09:59 AM    Glucose 98 04/17/2018 09:06 AM    Glucose  10/21/2016 11:00 AM    Microalb/Creat ratio (ug/mg creat.) 170.0 (H) 04/17/2018 09:06 AM    LDL, calculated 109 (H) 04/17/2018 09:06 AM    Creatinine 1.01 04/17/2018 09:06 AM      Lab Results   Component Value Date/Time    Cholesterol, total 164 04/17/2018 09:06 AM    HDL Cholesterol 35 (L) 04/17/2018 09:06 AM    LDL, calculated 109 (H) 04/17/2018 09:06 AM    Triglyceride 101 04/17/2018 09:06 AM     Lab Results   Component Value Date/Time    ALT (SGPT) 20 04/17/2018 09:06 AM    AST (SGOT) 22 04/17/2018 09:06 AM    Alk.  phosphatase 65 04/17/2018 09:06 AM    Bilirubin, total 0.6 04/17/2018 09:06 AM     Lab Results   Component Value Date/Time    GFR est  04/17/2018 09:06 AM    GFR est non-AA 87 04/17/2018 09:06 AM    Creatinine 1.01 04/17/2018 09:06 AM    BUN 14 04/17/2018 09:06 AM    Sodium 141 04/17/2018 09:06 AM    Potassium 4.2 04/17/2018 09:06 AM    Chloride 104 04/17/2018 09:06 AM    CO2 26 04/17/2018 09:06 AM      Lab Results   Component Value Date/Time    TSH 1.060 01/23/2017 09:57 AM        Assessment/Plan:     1. Type 2 Diabetes Mellitus with neuropathy,uncontrolled  Metformin  Trulicity   Tresiba 80 units at bedtime. Advised to check glucose 3 - 4 times daily  FLU annually ,Pneumovax ,aspirin daily,annual eye exam,microalbumin    2. HTN : Continue current therapy    3. Hyperlipidemia : lipitor     4. Obesity:Body mass index is 44.67 kg/(m^2). Diet discussed    5 ED - cialis ,     6. Hypogonadotrophic hypogonadism: Prolactin is very minimally elevated which is not significant  Discussed about obstructive sleep apnea symptoms. Weight loss encouraged. He is motivated to lose weight, 2 years ago he had lost 50-60 pounds. There are no Patient Instructions on file for this visit. Follow-up Disposition: Not on File    Thank you for allowing me to participate in the care of this patient.     Ana M Escalera MD

## 2018-04-24 NOTE — MR AVS SNAPSHOT
[address] [address] 
[phone] Patient: Zina Hawkins MRN: [id number] :1970 Visit Information Date & Time Provider Department Dept. Phone Encounter #  
 2018  3:45 PM Marti Boone MD [facility] [phone] [id number] Follow-up Instructions Return in about 5 months (around 2018). Upcoming Health Maintenance Date Due  
 FOOT EXAM Q1 10/9/1978 Pneumococcal 19-64 Medium Risk (1 of 1 - PPSV23) 10/9/1987 DTaP/Tdap/Td series (1 - Tdap) 10/9/1989 Influenza Age 5 to Adult 2017 EYE EXAM RETINAL OR DILATED Q1 2018 HEMOGLOBIN A1C Q6M 10/17/2018 MICROALBUMIN Q1 2019 LIPID PANEL Q1 2019 Allergies as of 2018  Review Complete On: 2018 By: Marti Boone MD  
  
 Severity Noted Reaction Type Reactions Shellfish Derived  07/15/2014    Rash Current Immunizations  Never Reviewed No immunizations on file. Not reviewed this visit You Were Diagnosed With   
  
 Codes Comments Type 2 diabetes mellitus with hyperglycemia, with long-term current use of insulin (HCC)    -  Primary ICD-10-CM: E11.65, Z79.4 ICD-9-CM: 250.00, 790.29, V58.67 Essential hypertension     ICD-10-CM: I10 
ICD-9-CM: 401.9 Mixed hyperlipidemia     ICD-10-CM: E78.2 ICD-9-CM: 272.2 Hypogonadotropic hypogonadism in male [facility])     ICD-10-CM: E23.0 ICD-9-CM: 253.4 Vitals BP Pulse Temp Resp Height(growth percentile) Weight(growth percentile) 140/72 (BP 1 Location: Left arm, BP Patient Position: Sitting) 85 97.1 °F (36.2 °C) (Oral) 18 6' 1\" (1.854 m) 338 lb 9.6 oz (153.6 kg) SpO2 BMI Smoking Status 97% 44.67 kg/m2 Former Smoker Vitals History BMI and BSA Data Body Mass Index Body Surface Area  
 44.67 kg/m 2 2.81 m 2 Preferred Pharmacy Pharmacy Name Phone [address] [place] [address] Fidelia Nassar [phone] Your Updated Medication List  
  
   
This list is accurate as of 4/24/18  4:26 PM.  Always use your most recent med list.  
  
  
  
  
 atorvastatin 10 mg tablet Commonly known as:  LIPITOR Take 1 Tab by mouth nightly. diclofenac EC 75 mg EC tablet Commonly known as:  VOLTAREN Take 75 mg by mouth as needed. glucose blood VI test strips strip Commonly known as:  Ascensia CONTOUR Test Blood Glucose 3 Times Daily  
  
 insulin glargine 100 unit/mL (3 mL) Inpn Commonly known as:  Nano Sill Inject 80 units at bed time Insulin Needles (Disposable) 32 gauge x 5/32\" Ndle Commonly known as:  Radha Pen Needle 3 x daily Lancets Misc Use to test blood glucose 3 times daily  
  
 lisinopril-hydroCHLOROthiazide 20-25 mg per tablet Commonly known as:  Marlaine Georgi Take 1 Tab by mouth daily. NAPROSYN 500 mg tablet Generic drug:  naproxen Take 500 mg by mouth two (2) times daily (with meals). PROAIR HFA 90 mcg/actuation inhaler Generic drug:  albuterol  
inhale 2 puffs by mouth every 4 hours TRULICITY 1.5 ZE/0.5 mL sub-q pen Generic drug:  dulaglutide INJECT 0.5ML SUBCUTANEOUSLY EVERY 7 DAYS Follow-up Instructions Return in about 5 months (around 9/24/2018). Patient Instructions Check blood sugars before breakfast,dinner and at bedtime. If the bedtime sugars are less than 100 ,eat a 15 gm snack. Weight and diet control. Metformin  mg BID Lantus insulin 80 units at bed time 
 
trulicity 1.5 mg Additional Humalog  with meals if blood sugars are[de-identified]  
 
150-200 mg   3 units 201-250 mg   6 units 251-300 mg   9 units 301-350 mg   12 units 351-400 mg   15 units Introducing [facility]!    
 [facility] introduces [employer] patient portal. Now you can access parts of your medical record, email your doctor's office, and request medication refills online. 1. In your internet browser, go to [email or web address] 2. Click on the First Time User? Click Here link in the Sign In box. You will see the New Member Sign Up page. 3. Enter your [facility]â€™[facility] Access Code exactly as it appears below. You will not need to use this code after youve completed the sign-up process. If you do not sign up before the expiration date, you must request a new code. · [facility]â€™s Access Code: [email or web address] Expires: 7/23/2018  4:26 PM 
 
4. Enter the last four digits of your Social Security Number (xxxx) and Date of Birth (mm/dd/yyyy) as indicated and click Submit. You will be taken to the next sign-up page. 5. Create a [facility]â€™s ID. This will be your [facility] login ID and cannot be changed, so think of one that is secure and easy to remember. 6. Create a [facility] password. You can change your password at any time. 7. Enter your Password Reset Question and Answer. This can be used at a later time if you forget your password. 8. Enter your e-mail address. You will receive e-mail notification when new information is available in [address] 9. Click Sign Up. You can now view and download portions of your medical record. 10. Click the Download Summary menu link to download a portable copy of your medical information. If you have questions, please visit the Frequently Asked Questions section of the [facility] website. Remember, [facility]â€™s is NOT to be used for urgent needs. For medical emergencies, dial 911. Now available from your iPhone and Android! Please provide this summary of care documentation to your next provider. Your primary care clinician is listed as Cristel Alvarez. If you have any questions after today's visit, please call [phone].

## 2018-04-25 DIAGNOSIS — E11.65 TYPE 2 DIABETES MELLITUS WITH HYPERGLYCEMIA, WITH LONG-TERM CURRENT USE OF INSULIN (HCC): Primary | ICD-10-CM

## 2018-04-25 DIAGNOSIS — Z79.4 TYPE 2 DIABETES MELLITUS WITH HYPERGLYCEMIA, WITH LONG-TERM CURRENT USE OF INSULIN (HCC): Primary | ICD-10-CM

## 2018-04-25 RX ORDER — INSULIN DEGLUDEC 200 U/ML
INJECTION, SOLUTION SUBCUTANEOUS
Qty: 54 ML | Refills: 3 | Status: SHIPPED | OUTPATIENT
Start: 2018-04-25 | End: 2019-05-12 | Stop reason: SDUPTHER

## 2018-04-26 DIAGNOSIS — I10 ESSENTIAL HYPERTENSION: ICD-10-CM

## 2018-04-26 DIAGNOSIS — E11.65 UNCONTROLLED TYPE 2 DIABETES MELLITUS WITH HYPERGLYCEMIA, WITH LONG-TERM CURRENT USE OF INSULIN (HCC): ICD-10-CM

## 2018-04-26 DIAGNOSIS — E78.5 HYPERLIPIDEMIA, UNSPECIFIED HYPERLIPIDEMIA TYPE: ICD-10-CM

## 2018-04-26 DIAGNOSIS — Z79.4 UNCONTROLLED TYPE 2 DIABETES MELLITUS WITH HYPERGLYCEMIA, WITH LONG-TERM CURRENT USE OF INSULIN (HCC): ICD-10-CM

## 2018-04-26 RX ORDER — LISINOPRIL AND HYDROCHLOROTHIAZIDE 20; 25 MG/1; MG/1
TABLET ORAL
Qty: 90 TAB | Refills: 2 | Status: SHIPPED | OUTPATIENT
Start: 2018-04-26 | End: 2019-04-08 | Stop reason: SDUPTHER

## 2018-08-17 ENCOUNTER — TELEPHONE (OUTPATIENT)
Dept: ENDOCRINOLOGY | Age: 48
End: 2018-08-17

## 2018-08-17 NOTE — TELEPHONE ENCOUNTER
Patient called needs a letter to see hair removal doctor. Please call with any questions or when letter is ready.  Thank you

## 2018-08-17 NOTE — LETTER
8/20/2018 1:58 PM 
 
Mr. Black Elizondo [id number] [facility] [address] [facility] [phone] To Whom it may concern, 
 
 
Mr Black Elizondo is currently under my care at [place] [address]. Based on an A1C result of 7%, Mr Raman Levy has good control of his blood glucose levels and he can proceed with electrolysis hair removal. 
 
If there are any questions, contact the office at [phone]. Sincerely, Farooq Espinoza MD

## 2018-08-17 NOTE — TELEPHONE ENCOUNTER
Based on last A1C result of 7 he has good control of the sugars, he can proceed with minor surgeries.

## 2018-09-23 RX ORDER — DULAGLUTIDE 1.5 MG/.5ML
INJECTION, SOLUTION SUBCUTANEOUS
Qty: 12 SYRINGE | Refills: 1 | Status: SHIPPED | OUTPATIENT
Start: 2018-09-23 | End: 2019-06-10 | Stop reason: SDUPTHER

## 2019-04-08 DIAGNOSIS — E11.65 UNCONTROLLED TYPE 2 DIABETES MELLITUS WITH HYPERGLYCEMIA, WITH LONG-TERM CURRENT USE OF INSULIN (HCC): ICD-10-CM

## 2019-04-08 DIAGNOSIS — I10 ESSENTIAL HYPERTENSION: ICD-10-CM

## 2019-04-08 DIAGNOSIS — Z79.4 UNCONTROLLED TYPE 2 DIABETES MELLITUS WITH HYPERGLYCEMIA, WITH LONG-TERM CURRENT USE OF INSULIN (HCC): ICD-10-CM

## 2019-04-08 DIAGNOSIS — E78.5 HYPERLIPIDEMIA, UNSPECIFIED HYPERLIPIDEMIA TYPE: ICD-10-CM

## 2019-04-08 RX ORDER — LISINOPRIL AND HYDROCHLOROTHIAZIDE 20; 25 MG/1; MG/1
TABLET ORAL
Qty: 90 TAB | Refills: 3 | Status: SHIPPED | OUTPATIENT
Start: 2019-04-08 | End: 2020-03-12

## 2019-09-23 ENCOUNTER — TELEPHONE (OUTPATIENT)
Dept: ENDOCRINOLOGY | Age: 49
End: 2019-09-23

## 2019-09-23 DIAGNOSIS — E78.2 MIXED HYPERLIPIDEMIA: ICD-10-CM

## 2019-09-23 DIAGNOSIS — Z79.4 TYPE 2 DIABETES MELLITUS WITH HYPERGLYCEMIA, WITH LONG-TERM CURRENT USE OF INSULIN (HCC): Primary | ICD-10-CM

## 2019-09-23 DIAGNOSIS — E11.65 TYPE 2 DIABETES MELLITUS WITH HYPERGLYCEMIA, WITH LONG-TERM CURRENT USE OF INSULIN (HCC): Primary | ICD-10-CM

## 2019-09-23 DIAGNOSIS — I10 ESSENTIAL HYPERTENSION: ICD-10-CM

## 2019-09-23 NOTE — TELEPHONE ENCOUNTER
Patient would like lab slip sent to [facility] they will draw labs there and have faxed back.  [phone]

## 2019-09-23 NOTE — TELEPHONE ENCOUNTER
Order placed for pt per verbal order with read back from Dr. Abbey Brumfield 09/23/19    Lab slips faxed.

## 2019-10-01 LAB
ALBUMIN SERPL-MCNC: 4.1 G/DL (ref 3.5–5.5)
ALBUMIN/CREAT UR: 31.2 MG/G CREAT (ref 0–30)
ALBUMIN/GLOB SERPL: 1.5 {RATIO} (ref 1.2–2.2)
ALP SERPL-CCNC: 101 IU/L (ref 39–117)
ALT SERPL-CCNC: 20 IU/L (ref 0–44)
AST SERPL-CCNC: 19 IU/L (ref 0–40)
BILIRUB SERPL-MCNC: 0.2 MG/DL (ref 0–1.2)
BUN SERPL-MCNC: 27 MG/DL (ref 6–24)
BUN/CREAT SERPL: 22 (ref 9–20)
CALCIUM SERPL-MCNC: 9 MG/DL (ref 8.7–10.2)
CHLORIDE SERPL-SCNC: 104 MMOL/L (ref 96–106)
CHOLEST SERPL-MCNC: 149 MG/DL (ref 100–199)
CO2 SERPL-SCNC: 25 MMOL/L (ref 20–29)
CREAT SERPL-MCNC: 1.21 MG/DL (ref 0.76–1.27)
CREAT UR-MCNC: 137.5 MG/DL
EST. AVERAGE GLUCOSE BLD GHB EST-MCNC: 169 MG/DL
GLOBULIN SER CALC-MCNC: 2.7 G/DL (ref 1.5–4.5)
GLUCOSE SERPL-MCNC: 200 MG/DL (ref 65–99)
HBA1C MFR BLD: 7.5 % (ref 4.8–5.6)
HDLC SERPL-MCNC: 29 MG/DL
INTERPRETATION, 910389: NORMAL
LDLC SERPL CALC-MCNC: 94 MG/DL (ref 0–99)
Lab: NORMAL
MICROALBUMIN UR-MCNC: 42.9 UG/ML
POTASSIUM SERPL-SCNC: 4.9 MMOL/L (ref 3.5–5.2)
PROT SERPL-MCNC: 6.8 G/DL (ref 6–8.5)
SODIUM SERPL-SCNC: 141 MMOL/L (ref 134–144)
TRIGL SERPL-MCNC: 132 MG/DL (ref 0–149)
VLDLC SERPL CALC-MCNC: 26 MG/DL (ref 5–40)

## 2019-11-06 NOTE — PROGRESS NOTES
Silvio Gandhi is a 44 y.o. male here for   Chief Complaint   Patient presents with    Diabetes     LV 4/2018    Hypogonadism       Functional glucose monitor and record keeping system? -not checking  Eye exam within last year? -[facility]   Foot exam within last year? - due    1. Have you been to the ER, urgent care clinic since your last visit? Hospitalized since your last visit? -[facility] in Dec appendectomy    2. Have you seen or consulted any other health care providers outside of the [address] [place] [address] since your last visit?   Include any pap smears or colon screening.-no

## 2019-11-07 ENCOUNTER — OFFICE VISIT (OUTPATIENT)
Dept: ENDOCRINOLOGY | Age: 49
End: 2019-11-07

## 2019-11-07 VITALS
RESPIRATION RATE: 18 BRPM | DIASTOLIC BLOOD PRESSURE: 63 MMHG | HEART RATE: 74 BPM | HEIGHT: 73 IN | WEIGHT: 315 LBS | BODY MASS INDEX: 41.75 KG/M2 | TEMPERATURE: 96.9 F | OXYGEN SATURATION: 97 % | SYSTOLIC BLOOD PRESSURE: 131 MMHG

## 2019-11-07 DIAGNOSIS — I10 ESSENTIAL HYPERTENSION: ICD-10-CM

## 2019-11-07 DIAGNOSIS — E11.65 TYPE 2 DIABETES MELLITUS WITH HYPERGLYCEMIA, WITH LONG-TERM CURRENT USE OF INSULIN (HCC): Primary | ICD-10-CM

## 2019-11-07 DIAGNOSIS — Z79.4 TYPE 2 DIABETES MELLITUS WITH HYPERGLYCEMIA, WITH LONG-TERM CURRENT USE OF INSULIN (HCC): Primary | ICD-10-CM

## 2019-11-07 DIAGNOSIS — E23.0 HYPOGONADOTROPIC HYPOGONADISM IN MALE (HCC): ICD-10-CM

## 2019-11-07 DIAGNOSIS — E78.2 MIXED HYPERLIPIDEMIA: ICD-10-CM

## 2019-11-07 DIAGNOSIS — E78.5 HYPERLIPIDEMIA, UNSPECIFIED HYPERLIPIDEMIA TYPE: ICD-10-CM

## 2019-11-07 DIAGNOSIS — E78.5 HYPERLIPIDEMIA: ICD-10-CM

## 2019-11-07 RX ORDER — INSULIN PUMP SYRINGE, 3 ML
EACH MISCELLANEOUS
Qty: 1 KIT | Refills: 0 | Status: SHIPPED | OUTPATIENT
Start: 2019-11-07

## 2019-11-07 RX ORDER — METFORMIN HYDROCHLORIDE 750 MG/1
750 TABLET, EXTENDED RELEASE ORAL
Qty: 90 TAB | Refills: 3 | Status: SHIPPED | OUTPATIENT
Start: 2019-11-07 | End: 2021-03-02

## 2019-11-07 RX ORDER — ATORVASTATIN CALCIUM 10 MG/1
10 TABLET, FILM COATED ORAL
Qty: 90 TAB | Refills: 3 | Status: SHIPPED | OUTPATIENT
Start: 2019-11-07 | End: 2021-03-02 | Stop reason: SDUPTHER

## 2019-11-07 RX ORDER — LANCETS
EACH MISCELLANEOUS
Qty: 300 EACH | Refills: 3 | Status: SHIPPED | OUTPATIENT
Start: 2019-11-07

## 2019-11-07 NOTE — LETTER
11/7/19 Patient: Kemal Celis YOB: 1970 Date of Visit: 11/7/2019 Joaquin Calles NP 
[address] [address] VIA Facsimile: [phone] Dear Joaquin Calles NP, Thank you for referring Mr. Kemal Celis to [address] [place] [address] for evaluation. My notes for this consultation are attached. If you have questions, please do not hesitate to call me. I look forward to following your patient along with you. Sincerely, Samra Campuzano MD

## 2019-11-07 NOTE — PROGRESS NOTES
Peggy Allen MD          Patient Information  Date:11/7/2019  Name : Tarik Harrison 44 y.o.     YOB: 1970         Referred by: Radha Manzano NP         Chief Complaint   Patient presents with    Diabetes     LV 4/2018    Hypogonadism       History of Present Illness: Tarik Harrison is a 44 y.o. male here for follow-up  He was seen 1-1/2-year ago  Type 2 DM on insulin  Ran out of Trulicity, not checking blood glucose  Diet could be better  Ran out of test strips  Weight has been fluctuating, now gradually gaining weight      Day shift - 7 A - 7 PM - 8 AM breakfast , 12 noon lunch , dinner 6 pm  Night shifts -   2 PM - 6 pm - 12 AM     Type 2 diabetes was diagnosed 14 years ago              Wt Readings from Last 3 Encounters:   11/07/19 339 lb (153.8 kg)   04/24/18 338 lb 9.6 oz (153.6 kg)   01/27/17 346 lb (156.9 kg)       BP Readings from Last 3 Encounters:   11/07/19 131/63   04/24/18 140/72   01/27/17 140/78           Past Medical History:   Diagnosis Date    HTN (hypertension)     Hyperlipidemia     Type II or unspecified type diabetes mellitus without mention of complication, uncontrolled      Current Outpatient Medications   Medication Sig    krill/om-3/dha/epa/phospho/ast (MAXIMUM RED KRILL OMEGA-3 PO) Take 1 Cap by mouth daily.     lisinopril-hydroCHLOROthiazide (PRINZIDE, ZESTORETIC) 20-25 mg per tablet TAKE 1 TABLET BY MOUTH DAILY    Insulin Needles, Disposable, (SAFIA PEN NEEDLE) 32 x 5/32 \" ndle 3 x daily    Lancets misc Use to test blood glucose 3 times daily    glucose blood VI test strips (ASCENSIA CONTOUR) strip Test Blood Glucose 3 Times Daily    TRULICITY 1.5 AJ/4.2 mL sub-q pen INJECT 0.5ML SUBCUTANEOUSLY EVERY 7 DAYS    insulin degludec (TRESIBA FLEXTOUCH U-200) 200 unit/mL (3 mL) inpn INJECT 80 UNITS SUBCUTANEOUSLY AT NIGHT (STOP LANTUS) (Patient taking differently: INJECT 70 UNITS SUBCUTANEOUSLY AT NIGHT (STOP LANTUS))    diclofenac EC (VOLTAREN) 75 mg EC tablet Take 75 mg by mouth as needed.  PROAIR HFA 90 mcg/actuation inhaler inhale 2 puffs by mouth every 4 hours    atorvastatin (LIPITOR) 10 mg tablet Take 1 Tab by mouth nightly.  naproxen (NAPROSYN) 500 mg tablet Take 500 mg by mouth two (2) times daily (with meals). No current facility-administered medications for this visit. Allergies   Allergen Reactions    Shellfish Derived Rash         Review of Systems:  -   - Eyes: no blurry vision no double vision  - Cardiovascular: no chest pain ,no palpitations  - Respiratory: no cough no shortness of breath  - Gastrointestinal: no dysphagia no  abdominal pain  - Musculoskeletal: + Joint pains + weakness  - Integumentary: no rashes  - Neurological: no numbness, tingling, no  headaches  -     Physical Examination:   Blood pressure 131/63, pulse 74, temperature 96.9 °F (36.1 °C), temperature source Oral, resp. rate 18, height 6' 1\" (1.854 m), weight 339 lb (153.8 kg), SpO2 97 %. Estimated body mass index is 44.73 kg/m² as calculated from the following:    Height as of this encounter: 6' 1\" (1.854 m). -   Weight as of this encounter: 339 lb (153.8 kg).   - General: pleasant, no distress, good eye contact  - HEENT: no pallor, no periorbital edema, EOMI  - Neck: supple, no thyromegaly  - Cardiovascular: regular, normal rate, normal S1 and S2  - Respiratory: clear to auscultation bilaterally  - Gastrointestinal: soft, nontender, nondistended,  BS +  - Musculoskeletal: + edema, callus , left hammer toe   - Neurological: alert and oriented  - Psychiatric: normal mood and affect  - Skin: color, texture, turgor normal.     Diabetic foot exam: November 2019    Left:     Vibratory sensation absent   Filament test decreased sensation with micro filament   Pulse DP: 1+    Deformities: Hammertoe  Right:    Vibratory sensation absent   Filament test decreased sensation with micro filament   Pulse DP: 1+   Deformities: Hammertoe, callus      Data Reviewed:       Lab Results   Component Value Date/Time    Hemoglobin A1c 7.5 (H) 09/28/2019 12:00 AM    Hemoglobin A1c 7.0 (H) 04/17/2018 09:06 AM    Hemoglobin A1c 8.4 (H) 01/23/2017 09:57 AM    Glucose 200 (H) 09/28/2019 12:00 AM    Glucose  10/21/2016 11:00 AM    Microalb/Creat ratio (ug/mg creat.) 31.2 (H) 09/28/2019 12:00 AM    LDL, calculated 94 09/28/2019 12:00 AM    Creatinine 1.21 09/28/2019 12:00 AM      Lab Results   Component Value Date/Time    Cholesterol, total 149 09/28/2019 12:00 AM    HDL Cholesterol 29 (L) 09/28/2019 12:00 AM    LDL, calculated 94 09/28/2019 12:00 AM    Triglyceride 132 09/28/2019 12:00 AM     Lab Results   Component Value Date/Time    ALT (SGPT) 20 09/28/2019 12:00 AM    AST (SGOT) 19 09/28/2019 12:00 AM    Alk. phosphatase 101 09/28/2019 12:00 AM    Bilirubin, total 0.2 09/28/2019 12:00 AM     Lab Results   Component Value Date/Time    GFR est AA 80 09/28/2019 12:00 AM    GFR est non-AA 69 09/28/2019 12:00 AM    Creatinine 1.21 09/28/2019 12:00 AM    BUN 27 (H) 09/28/2019 12:00 AM    Sodium 141 09/28/2019 12:00 AM    Potassium 4.9 09/28/2019 12:00 AM    Chloride 104 09/28/2019 12:00 AM    CO2 25 09/28/2019 12:00 AM      Lab Results   Component Value Date/Time    TSH 1.060 01/23/2017 09:57 AM        Assessment/Plan:     1. Type 2 Diabetes Mellitus with neuropathy,uncontrolled  Metformin extended release 750 mg, could not tolerate higher dose  Trulicity   Tresiba    Advised to check glucose 3 - 4 times daily  FLU annually ,Pneumovax ,aspirin daily,annual eye exam,microalbumin    2. HTN : Continue current therapy    3. Hyperlipidemia : lifestyle changes discussed. If no improvement statins    4. Obesity:Body mass index is 44.73 kg/m². Diet discussed    5  Hypogonadism /ED -need to lose weight   Shift worker     Counseled at length    There are no Patient Instructions on file for this visit.       Thank you for allowing me to participate in the care of this patient. Shekhar Dolan MD      Patient verbalized understanding    Voice-recognition software was used to generate this report, which may result in some phonetic-based errors in the grammar and contents. Even though attempts were made to correct all the mistakes, some may have been missed and remained in the body of the report.

## 2020-01-30 ENCOUNTER — APPOINTMENT (OUTPATIENT)
Dept: ENDOCRINOLOGY | Age: 50
End: 2020-01-30

## 2020-01-30 DIAGNOSIS — I10 ESSENTIAL HYPERTENSION: ICD-10-CM

## 2020-01-30 DIAGNOSIS — E78.2 MIXED HYPERLIPIDEMIA: ICD-10-CM

## 2020-01-30 DIAGNOSIS — Z79.4 TYPE 2 DIABETES MELLITUS WITH HYPERGLYCEMIA, WITH LONG-TERM CURRENT USE OF INSULIN (HCC): ICD-10-CM

## 2020-01-30 DIAGNOSIS — E11.65 TYPE 2 DIABETES MELLITUS WITH HYPERGLYCEMIA, WITH LONG-TERM CURRENT USE OF INSULIN (HCC): ICD-10-CM

## 2020-01-31 LAB
ALBUMIN SERPL-MCNC: 4 G/DL (ref 3.8–4.9)
ALBUMIN/CREAT UR: 41 MG/G CREAT (ref 0–29)
ALBUMIN/GLOB SERPL: 1.5 {RATIO} (ref 1.2–2.2)
ALP SERPL-CCNC: 81 IU/L (ref 39–117)
ALT SERPL-CCNC: 18 IU/L (ref 0–44)
AST SERPL-CCNC: 13 IU/L (ref 0–40)
BILIRUB SERPL-MCNC: 0.7 MG/DL (ref 0–1.2)
BUN SERPL-MCNC: 16 MG/DL (ref 6–24)
BUN/CREAT SERPL: 14 (ref 9–20)
CALCIUM SERPL-MCNC: 9.2 MG/DL (ref 8.7–10.2)
CHLORIDE SERPL-SCNC: 103 MMOL/L (ref 96–106)
CHOLEST SERPL-MCNC: 120 MG/DL (ref 100–199)
CO2 SERPL-SCNC: 28 MMOL/L (ref 20–29)
CREAT SERPL-MCNC: 1.17 MG/DL (ref 0.76–1.27)
CREAT UR-MCNC: 107.4 MG/DL
EST. AVERAGE GLUCOSE BLD GHB EST-MCNC: 180 MG/DL
GLOBULIN SER CALC-MCNC: 2.7 G/DL (ref 1.5–4.5)
GLUCOSE SERPL-MCNC: 133 MG/DL (ref 65–99)
HBA1C MFR BLD: 7.9 % (ref 4.8–5.6)
HDLC SERPL-MCNC: 34 MG/DL
INTERPRETATION, 910389: NORMAL
LDLC SERPL CALC-MCNC: 72 MG/DL (ref 0–99)
Lab: NORMAL
MICROALBUMIN UR-MCNC: 43.8 UG/ML
POTASSIUM SERPL-SCNC: 4.6 MMOL/L (ref 3.5–5.2)
PROT SERPL-MCNC: 6.7 G/DL (ref 6–8.5)
SODIUM SERPL-SCNC: 143 MMOL/L (ref 134–144)
TRIGL SERPL-MCNC: 71 MG/DL (ref 0–149)
VLDLC SERPL CALC-MCNC: 14 MG/DL (ref 5–40)

## 2020-02-06 NOTE — PROGRESS NOTES
Nayely Browne is a 44 y.o. male here for   Chief Complaint   Patient presents with    Diabetes    Hypogonadism       1. Have you been to the ER, urgent care clinic since your last visit? Hospitalized since your last visit? -no    2. Have you seen or consulted any other health care providers outside of the [address] [place] [address] since your last visit? Include any pap smears or colon screening. -PCP

## 2020-02-07 ENCOUNTER — OFFICE VISIT (OUTPATIENT)
Dept: ENDOCRINOLOGY | Age: 50
End: 2020-02-07

## 2020-02-07 VITALS
SYSTOLIC BLOOD PRESSURE: 150 MMHG | RESPIRATION RATE: 20 BRPM | HEIGHT: 73 IN | TEMPERATURE: 97.3 F | HEART RATE: 72 BPM | OXYGEN SATURATION: 99 % | BODY MASS INDEX: 41.75 KG/M2 | WEIGHT: 315 LBS | DIASTOLIC BLOOD PRESSURE: 79 MMHG

## 2020-02-07 DIAGNOSIS — E23.0 HYPOGONADOTROPIC HYPOGONADISM IN MALE (HCC): ICD-10-CM

## 2020-02-07 DIAGNOSIS — E11.65 TYPE 2 DIABETES MELLITUS WITH HYPERGLYCEMIA, UNSPECIFIED WHETHER LONG TERM INSULIN USE (HCC): ICD-10-CM

## 2020-02-07 DIAGNOSIS — Z79.4 TYPE 2 DIABETES MELLITUS WITH HYPERGLYCEMIA, WITH LONG-TERM CURRENT USE OF INSULIN (HCC): Primary | ICD-10-CM

## 2020-02-07 DIAGNOSIS — I10 ESSENTIAL HYPERTENSION: ICD-10-CM

## 2020-02-07 DIAGNOSIS — E78.5 HYPERLIPIDEMIA: ICD-10-CM

## 2020-02-07 DIAGNOSIS — E11.65 TYPE 2 DIABETES MELLITUS WITH HYPERGLYCEMIA, WITH LONG-TERM CURRENT USE OF INSULIN (HCC): Primary | ICD-10-CM

## 2020-02-07 DIAGNOSIS — E78.2 MIXED HYPERLIPIDEMIA: ICD-10-CM

## 2020-02-07 RX ORDER — MULTIVITAMIN
1 TABLET ORAL DAILY
COMMUNITY
End: 2020-06-18

## 2020-02-07 RX ORDER — PEN NEEDLE, DIABETIC 31 GX3/16"
NEEDLE, DISPOSABLE MISCELLANEOUS
Qty: 300 PEN NEEDLE | Refills: 3 | Status: SHIPPED | OUTPATIENT
Start: 2020-02-07

## 2020-02-07 NOTE — PROGRESS NOTES
Shanique Mason MD          Patient Information  Date:2/7/2020  Name : Nayely Browne 44 y.o.     YOB: 1970         Referred by: Jayjay Fraire NP         Chief Complaint   Patient presents with    Diabetes    Hypogonadism       History of Present Illness: Nayely Browne is a 44 y.o. male here for follow-up  Last visit November 2019  Type 2 DM on insulin  Weight has been stable  He is taking 70 units of Tresiba, not sure how it got decreased from 80 units  Diet could be better  He is not checking the blood glucose  Could not tolerate metformin and hence not taking      Day shift - 7 A - 7 PM - 8 AM breakfast , 12 noon lunch , dinner 6 pm  Night shifts -   2 PM - 6 pm - 12 AM     Type 2 diabetes was diagnosed 14 years ago              Wt Readings from Last 3 Encounters:   02/07/20 334 lb (151.5 kg)   11/07/19 339 lb (153.8 kg)   04/24/18 338 lb 9.6 oz (153.6 kg)       BP Readings from Last 3 Encounters:   02/07/20 150/79   11/07/19 131/63   04/24/18 140/72           Past Medical History:   Diagnosis Date    HTN (hypertension)     Hyperlipidemia     Type II or unspecified type diabetes mellitus without mention of complication, uncontrolled      Current Outpatient Medications   Medication Sig    CHROMIUM PICOLINATE PO Take 1 Tab by mouth daily.  cinnamon bark (CINNAMON) 500 mg cap Take 1 Tab by mouth daily.  TURMERIC PO Take 1 Tab by mouth daily.  dulaglutide (TRULICITY) 1.5 UE/9.7 mL sub-q pen INJECT 0.5ML SUBCUTANEOUSLY EVERY 7 DAYS    atorvastatin (LIPITOR) 10 mg tablet Take 1 Tab by mouth nightly.  lancets misc Use to test blood glucose 3 times daily. Dx code E11.65    glucose blood VI test strips (BLOOD GLUCOSE TEST) strip Use to check BG 3 x daily. E11.65    Blood-Glucose Meter monitoring kit Use to check BG 3 x daily.  Dx code E11.65    insulin degludec (TRESIBA FLEXTOUCH U-200) 200 unit/mL (3 mL) inpn INJECT 80 UNITS SUBCUTANEOUSLY AT NIGHT (STOP LANTUS) (Patient taking differently: INJECT 70 UNITS SUBCUTANEOUSLY AT NIGHT (STOP LANTUS))    lisinopril-hydroCHLOROthiazide (PRINZIDE, ZESTORETIC) 20-25 mg per tablet TAKE 1 TABLET BY MOUTH DAILY    Insulin Needles, Disposable, (SAFIA PEN NEEDLE) 32 x 5/32 \" ndle 3 x daily    glucose blood VI test strips (ASCENSIA CONTOUR) strip Test Blood Glucose 3 Times Daily    krill/om-3/dha/epa/phospho/ast (MAXIMUM RED KRILL OMEGA-3 PO) Take 1 Cap by mouth daily.  metFORMIN ER (GLUCOPHAGE XR) 750 mg tablet Take 1 Tab by mouth Daily (before dinner).  diclofenac EC (VOLTAREN) 75 mg EC tablet Take 75 mg by mouth as needed.  PROAIR HFA 90 mcg/actuation inhaler inhale 2 puffs by mouth every 4 hours    naproxen (NAPROSYN) 500 mg tablet Take 500 mg by mouth two (2) times daily (with meals). No current facility-administered medications for this visit. Allergies   Allergen Reactions    Shellfish Derived Rash         Review of Systems:  -   - Eyes: no blurry vision no double vision  - Cardiovascular: no chest pain ,no palpitations  - Respiratory: no cough no shortness of breath  - Gastrointestinal: no dysphagia no  abdominal pain  - Musculoskeletal: + Joint pains + weakness  - Integumentary: no rashes  - Neurological: no numbness, tingling, no  headaches  -     Physical Examination:   Blood pressure 150/79, pulse 72, temperature 97.3 °F (36.3 °C), temperature source Oral, resp. rate 20, height 6' 1\" (1.854 m), weight 334 lb (151.5 kg), SpO2 99 %. Estimated body mass index is 44.07 kg/m² as calculated from the following:    Height as of this encounter: 6' 1\" (1.854 m). -   Weight as of this encounter: 334 lb (151.5 kg).   - General: pleasant, no distress, good eye contact  - HEENT: no pallor, no periorbital edema, EOMI  - Neck: supple,  - Cardiovascular: regular, normal rate, normal S1 and S2  - Respiratory: clear to auscultation bilaterally  - Gastrointestinal: soft, nontender, nondistended,  BS +  - Musculoskeletal: + edema, callus , left hammer toe   - Neurological: alert and oriented  - Psychiatric: normal mood and affect  - Skin: color, texture, turgor normal.     Diabetic foot exam: November 2019    Left:     Vibratory sensation absent   Filament test decreased sensation with micro filament   Pulse DP: 1+    Deformities: Hammertoe  Right:    Vibratory sensation absent   Filament test decreased sensation with micro filament   Pulse DP: 1+   Deformities: Hammertoe, callus      Data Reviewed:       Lab Results   Component Value Date/Time    Hemoglobin A1c 7.9 (H) 01/30/2020 12:00 AM    Hemoglobin A1c 7.5 (H) 09/28/2019 12:00 AM    Hemoglobin A1c 7.0 (H) 04/17/2018 09:06 AM    Glucose 133 (H) 01/30/2020 12:00 AM    Glucose  10/21/2016 11:00 AM    Microalb/Creat ratio (ug/mg creat.) 41 (H) 01/30/2020 12:00 AM    LDL, calculated 72 01/30/2020 12:00 AM    Creatinine 1.17 01/30/2020 12:00 AM      Lab Results   Component Value Date/Time    Cholesterol, total 120 01/30/2020 12:00 AM    HDL Cholesterol 34 (L) 01/30/2020 12:00 AM    LDL, calculated 72 01/30/2020 12:00 AM    Triglyceride 71 01/30/2020 12:00 AM     Lab Results   Component Value Date/Time    ALT (SGPT) 18 01/30/2020 12:00 AM    AST (SGOT) 13 01/30/2020 12:00 AM    Alk. phosphatase 81 01/30/2020 12:00 AM    Bilirubin, total 0.7 01/30/2020 12:00 AM     Lab Results   Component Value Date/Time    GFR est AA 83 01/30/2020 12:00 AM    GFR est non-AA 72 01/30/2020 12:00 AM    Creatinine 1.17 01/30/2020 12:00 AM    BUN 16 01/30/2020 12:00 AM    Sodium 143 01/30/2020 12:00 AM    Potassium 4.6 01/30/2020 12:00 AM    Chloride 103 01/30/2020 12:00 AM    CO2 28 01/30/2020 12:00 AM      Lab Results   Component Value Date/Time    TSH 1.060 01/23/2017 09:57 AM        Assessment/Plan:     1.  Type 2 Diabetes Mellitus with neuropathy,uncontrolled  Metformin extended release 750 mg, could not tolerate higher dose, agreed to try 750 mg again  [place], if you cannot tolerate Metformin increase to 80 units     Advised to check glucose 3 - 4 times daily  FLU annually ,Pneumovax ,aspirin daily,annual eye exam,microalbumin    2. HTN : Continue current therapy    3. Hyperlipidemia : Statin    4. Obesity:Body mass index is 44.07 kg/m². Diet discussed    5  Hypogonadism /ED -need to lose weight   Shift worker     Counseled at length    There are no Patient Instructions on file for this visit. Thank you for allowing me to participate in the care of this patient. Yessy Iraheta MD      Patient verbalized understanding    Voice-recognition software was used to generate this report, which may result in some phonetic-based errors in the grammar and contents. Even though attempts were made to correct all the mistakes, some may have been missed and remained in the body of the report.

## 2020-02-07 NOTE — PATIENT INSTRUCTIONS
Check blood sugars before breakfast,dinner and at bedtime. If the bedtime sugars are less than 100 ,eat a 15 gm snack. Weight and diet control.     Metformin      Lantus insulin 80 units at bed time    trulicity 1.5 mg       Additional Humalog  with meals if blood sugars are[de-identified]     150-200 mg   3 units   201-250 mg   6 units   251-300 mg   9 units   301-350 mg   12 units   351-400 mg   15 units

## 2020-02-07 NOTE — LETTER
2/7/20 Patient: Angela Parker YOB: 1970 Date of Visit: 2/7/2020 Lyudmila Espinoza NP 
[address] [place] [place] [address] VIA Facsimile: [phone] Dear Lyudmila Espinoza NP, Thank you for referring Mr. Angela Parker to [address] [place] [address] for evaluation. My notes for this consultation are attached. If you have questions, please do not hesitate to call me. I look forward to following your patient along with you. Sincerely, Jeovanny Swenson MD

## 2020-03-12 DIAGNOSIS — Z79.4 UNCONTROLLED TYPE 2 DIABETES MELLITUS WITH HYPERGLYCEMIA, WITH LONG-TERM CURRENT USE OF INSULIN (HCC): ICD-10-CM

## 2020-03-12 DIAGNOSIS — E11.65 UNCONTROLLED TYPE 2 DIABETES MELLITUS WITH HYPERGLYCEMIA, WITH LONG-TERM CURRENT USE OF INSULIN (HCC): ICD-10-CM

## 2020-03-12 DIAGNOSIS — I10 ESSENTIAL HYPERTENSION: ICD-10-CM

## 2020-03-12 DIAGNOSIS — E78.5 HYPERLIPIDEMIA, UNSPECIFIED HYPERLIPIDEMIA TYPE: ICD-10-CM

## 2020-03-12 RX ORDER — LISINOPRIL AND HYDROCHLOROTHIAZIDE 20; 25 MG/1; MG/1
TABLET ORAL
Qty: 90 TAB | Refills: 3 | Status: SHIPPED | OUTPATIENT
Start: 2020-03-12 | End: 2021-12-03 | Stop reason: SDUPTHER

## 2020-06-18 ENCOUNTER — OFFICE VISIT (OUTPATIENT)
Dept: ENDOCRINOLOGY | Age: 50
End: 2020-06-18

## 2020-06-18 VITALS
BODY MASS INDEX: 41.75 KG/M2 | HEIGHT: 73 IN | OXYGEN SATURATION: 98 % | HEART RATE: 77 BPM | TEMPERATURE: 98 F | RESPIRATION RATE: 18 BRPM | SYSTOLIC BLOOD PRESSURE: 139 MMHG | DIASTOLIC BLOOD PRESSURE: 79 MMHG | WEIGHT: 315 LBS

## 2020-06-18 DIAGNOSIS — E11.65 TYPE 2 DIABETES MELLITUS WITH HYPERGLYCEMIA, UNSPECIFIED WHETHER LONG TERM INSULIN USE (HCC): ICD-10-CM

## 2020-06-18 DIAGNOSIS — I10 ESSENTIAL HYPERTENSION: ICD-10-CM

## 2020-06-18 DIAGNOSIS — Z79.4 TYPE 2 DIABETES MELLITUS WITH HYPERGLYCEMIA, WITH LONG-TERM CURRENT USE OF INSULIN (HCC): Primary | ICD-10-CM

## 2020-06-18 DIAGNOSIS — E11.65 TYPE 2 DIABETES MELLITUS WITH HYPERGLYCEMIA, WITH LONG-TERM CURRENT USE OF INSULIN (HCC): Primary | ICD-10-CM

## 2020-06-18 DIAGNOSIS — E23.0 HYPOGONADOTROPIC HYPOGONADISM IN MALE (HCC): ICD-10-CM

## 2020-06-18 DIAGNOSIS — E78.2 MIXED HYPERLIPIDEMIA: ICD-10-CM

## 2020-06-18 LAB — HBA1C MFR BLD HPLC: 7.5 %

## 2020-06-18 RX ORDER — FLASH GLUCOSE SCANNING READER
EACH MISCELLANEOUS
Qty: 1 EACH | Refills: 0 | Status: SHIPPED | OUTPATIENT
Start: 2020-06-18

## 2020-06-18 RX ORDER — FLASH GLUCOSE SENSOR
KIT MISCELLANEOUS
Qty: 6 KIT | Refills: 4 | Status: SHIPPED | OUTPATIENT
Start: 2020-06-18 | End: 2021-02-11

## 2020-06-18 NOTE — PROGRESS NOTES
He is on 80 units of Chidi vAila MD          Patient Information  Date:6/18/2020  Name : Jonh Montilla 44 y.o.     YOB: 1970         Referred by: No primary care provider on file.          Chief Complaint   Patient presents with    Diabetes    Hypogonadism       History of Present Illness: John Montilla is a 44 y.o. male here for follow-up  Last visit November 2019  Type 2 DM on insulin    He is on 80 units of Tresiba  Weight has been stable  He could only tolerate half a tablet of metformin  Hypoglycemia when he did not eat breakfast or lunch and was washing the cars in the afternoon        Day shift - 7 A - 7 PM - 8 AM breakfast , 12 noon lunch , dinner 6 pm  Night shifts -   2 PM - 6 pm - 12 AM     Type 2 diabetes was diagnosed 14 years ago              Wt Readings from Last 3 Encounters:   06/18/20 332 lb (150.6 kg)   02/07/20 334 lb (151.5 kg)   11/07/19 339 lb (153.8 kg)       BP Readings from Last 3 Encounters:   06/18/20 139/79   02/07/20 150/79   11/07/19 131/63           Past Medical History:   Diagnosis Date    HTN (hypertension)     Hyperlipidemia     Type II or unspecified type diabetes mellitus without mention of complication, uncontrolled      Current Outpatient Medications   Medication Sig    insulin degludec (Tresiba FlexTouch U-200) 200 unit/mL (3 mL) inpn INJECT 80 UNITS SUBCUTANEOUSLY AT NIGHT    lisinopril-hydroCHLOROthiazide (PRINZIDE, ZESTORETIC) 20-25 mg per tablet TAKE 1 TABLET BY MOUTH DAILY    glucose blood VI test strips (ASCENSIA CONTOUR) strip Test Blood Glucose 3 Times Daily    Insulin Needles, Disposable, (SAFIA PEN NEEDLE) 32 gauge x 5/32\" ndle Use TID Dx Code: E11.65    flash glucose sensor (FREESTYLE MARKOS 14 DAY SENSOR) kit Use as directed every 14 days DX Code: E11.65    flash glucose scanning reader (FREESTYLE MARKOS 14 DAY READER) misc Use as directed daily Dx Code: E11.65    dulaglutide (TRULICITY) 1.5 SJ/1.1 mL sub-q pen INJECT 0.5ML SUBCUTANEOUSLY EVERY 7 DAYS    atorvastatin (LIPITOR) 10 mg tablet Take 1 Tab by mouth nightly.  lancets misc Use to test blood glucose 3 times daily. Dx code E11.65    Blood-Glucose Meter monitoring kit Use to check BG 3 x daily. Dx code E11.65    CHROMIUM PICOLINATE PO Take 1 Tab by mouth daily.  cinnamon bark (CINNAMON) 500 mg cap Take 1 Tab by mouth daily.  TURMERIC PO Take 1 Tab by mouth daily.  krill/om-3/dha/epa/phospho/ast (MAXIMUM RED KRILL OMEGA-3 PO) Take 1 Cap by mouth daily.  metFORMIN ER (GLUCOPHAGE XR) 750 mg tablet Take 1 Tab by mouth Daily (before dinner).  diclofenac EC (VOLTAREN) 75 mg EC tablet Take 75 mg by mouth as needed.  PROAIR HFA 90 mcg/actuation inhaler inhale 2 puffs by mouth every 4 hours    naproxen (NAPROSYN) 500 mg tablet Take 500 mg by mouth two (2) times daily (with meals). No current facility-administered medications for this visit. Allergies   Allergen Reactions    Shellfish Derived Rash         Review of Systems:  -   - Eyes: no blurry vision no double vision  - Cardiovascular: no chest pain ,no palpitations  - Respiratory: no cough no shortness of breath  - Gastrointestinal: no dysphagia no  abdominal pain  - Musculoskeletal: + Joint pains + weakness  - Integumentary: no rashes  - Neurological: no numbness, tingling, no  headaches  -     Physical Examination:   Blood pressure 139/79, pulse 77, temperature 98 °F (36.7 °C), temperature source Oral, resp. rate 18, height 6' 1\" (1.854 m), weight 332 lb (150.6 kg), SpO2 98 %. Estimated body mass index is 43.8 kg/m² as calculated from the following:    Height as of this encounter: 6' 1\" (1.854 m). -   Weight as of this encounter: 332 lb (150.6 kg).   - General: pleasant, no distress, good eye contact  - HEENT: no pallor, no periorbital edema, EOMI  - Neck: supple,  - Cardiovascular: regular, normal rate, normal S1 and S2  - Respiratory: clear to auscultation bilaterally  -   - Musculoskeletal: + edema,  - Neurological: alert and oriented  - Psychiatric: normal mood and affect  - Skin: color, texture, turgor normal.     Diabetic foot exam: November 2019    Left:     Vibratory sensation absent   Filament test decreased sensation with micro filament   Pulse DP: 1+    Deformities: Hammertoe  Right:    Vibratory sensation absent   Filament test decreased sensation with micro filament   Pulse DP: 1+   Deformities: Hammertoe, callus      Data Reviewed:       Lab Results   Component Value Date/Time    Hemoglobin A1c 7.9 (H) 01/30/2020 12:00 AM    Hemoglobin A1c 7.5 (H) 09/28/2019 12:00 AM    Hemoglobin A1c 7.0 (H) 04/17/2018 09:06 AM    Glucose 133 (H) 01/30/2020 12:00 AM    Glucose  10/21/2016 11:00 AM    Microalb/Creat ratio (ug/mg creat.) 41 (H) 01/30/2020 12:00 AM    LDL, calculated 72 01/30/2020 12:00 AM    Creatinine 1.17 01/30/2020 12:00 AM      Lab Results   Component Value Date/Time    Cholesterol, total 120 01/30/2020 12:00 AM    HDL Cholesterol 34 (L) 01/30/2020 12:00 AM    LDL, calculated 72 01/30/2020 12:00 AM    Triglyceride 71 01/30/2020 12:00 AM     Lab Results   Component Value Date/Time    ALT (SGPT) 18 01/30/2020 12:00 AM    Alk. phosphatase 81 01/30/2020 12:00 AM    Bilirubin, total 0.7 01/30/2020 12:00 AM     Lab Results   Component Value Date/Time    GFR est AA 83 01/30/2020 12:00 AM    GFR est non-AA 72 01/30/2020 12:00 AM    Creatinine 1.17 01/30/2020 12:00 AM    BUN 16 01/30/2020 12:00 AM    Sodium 143 01/30/2020 12:00 AM    Potassium 4.6 01/30/2020 12:00 AM    Chloride 103 01/30/2020 12:00 AM    CO2 28 01/30/2020 12:00 AM      Lab Results   Component Value Date/Time    TSH 1.060 01/23/2017 09:57 AM        Assessment/Plan:     1.  Type 2 Diabetes Mellitus with neuropathy,uncontrolled  Metformin extended release 750 mg, could not tolerate higher dose  Trulicity   Tresiba  Continuous glucose monitor data downloaded for 2 weeks and reviewed with the patient  Weekends post dinner hyperglycemia  Hypoglycemia when he missed meal  Avoid skipping meals      Advised to check glucose 3 - 4 times daily  FLU annually ,Pneumovax ,aspirin daily,annual eye exam,microalbumin    2. HTN : Continue current therapy    3. Hyperlipidemia : Statin    4. Obesity:Body mass index is 43.8 kg/m². Diet discussed    5  Hypogonadism /ED -need to lose weight   Shift worker         There are no Patient Instructions on file for this visit. Thank you for allowing me to participate in the care of this patient. Lucrecia Riley MD      Patient verbalized understanding    Voice-recognition software was used to generate this report, which may result in some phonetic-based errors in the grammar and contents. Even though attempts were made to correct all the mistakes, some may have been missed and remained in the body of the report.

## 2020-06-18 NOTE — PROGRESS NOTES
Radha Cueva is a 44 y.o. male here for   Chief Complaint   Patient presents with    Diabetes    Hypogonadism       1. Have you been to the ER, urgent care clinic since your last visit? Hospitalized since your last visit? -no    2. Have you seen or consulted any other health care providers outside of the [address] [place] [address] since your last visit?   Include any pap smears or colon screening.-no

## 2020-09-25 ENCOUNTER — HOSPITAL ENCOUNTER (EMERGENCY)
Age: 50
Discharge: HOME | End: 2020-09-26
Attending: INTERNAL MEDICINE
Payer: COMMERCIAL

## 2020-09-25 VITALS
HEART RATE: 78 BPM | DIASTOLIC BLOOD PRESSURE: 84 MMHG | OXYGEN SATURATION: 98 % | WEIGHT: 315 LBS | SYSTOLIC BLOOD PRESSURE: 150 MMHG | TEMPERATURE: 98.8 F | HEIGHT: 75 IN | BODY MASS INDEX: 39.17 KG/M2 | RESPIRATION RATE: 16 BRPM

## 2020-09-25 DIAGNOSIS — L08.9 DIABETIC FOOT INFECTION (HCC): ICD-10-CM

## 2020-09-25 DIAGNOSIS — M86.271 SUBACUTE OSTEOMYELITIS OF RIGHT FOOT (HCC): Primary | ICD-10-CM

## 2020-09-25 DIAGNOSIS — E11.628 DIABETIC FOOT INFECTION (HCC): ICD-10-CM

## 2020-09-25 PROCEDURE — 99282 EMERGENCY DEPT VISIT SF MDM: CPT

## 2020-09-25 PROCEDURE — 99283 EMERGENCY DEPT VISIT LOW MDM: CPT

## 2020-09-25 NOTE — Clinical Note
[address] [address] 
[phone] Work/School Note Date: 9/25/2020 To Whom It May concern: 
 
Vanesa Shen was seen and treated today in the emergency room by the following provider(s): 
Attending Provider: Uriel Garsia MD.   
 
Vanesa Shen is excused from work/school on 9/26/2020 through 9/29/2020. He is medically clear to return to work/school on 9/30/2020. Sincerely, Melinda Snell MD

## 2020-09-26 ENCOUNTER — APPOINTMENT (OUTPATIENT)
Dept: GENERAL RADIOLOGY | Age: 50
End: 2020-09-26
Attending: INTERNAL MEDICINE
Payer: COMMERCIAL

## 2020-09-26 PROCEDURE — 73660 X-RAY EXAM OF TOE(S): CPT

## 2020-09-26 NOTE — ED PROVIDER NOTES
Toe Pain       Pt's toe is swollen and discolored    Past Medical History:   Diagnosis Date    HTN (hypertension)     Hyperlipidemia     Type II or unspecified type diabetes mellitus without mention of complication, uncontrolled        Past Surgical History:   Procedure Laterality Date    HX APPENDECTOMY  12/10/2018    HX CATARACT REMOVAL Left 08/2019    HX COLONOSCOPY      HX OTHER SURGICAL      knee scope         Family History:   Problem Relation Age of Onset    Cancer Mother         lung    Diabetes Mother     Cancer Brother         colon    Diabetes Sister     Diabetes Other         son and aunts       Social History     Socioeconomic History    Marital status: SINGLE     Spouse name: Not on file    Number of children: Not on file    Years of education: Not on file    Highest education level: Not on file   Occupational History    Not on file   Social Needs    Financial resource strain: Not on file    Food insecurity     Worry: Not on file     Inability: Not on file    Transportation needs     Medical: Not on file     Non-medical: Not on file   Tobacco Use    Smoking status: Former Smoker    Smokeless tobacco: Never Used   Substance and Sexual Activity    Alcohol use: Not Currently     Frequency: Never    Drug use: Not Currently    Sexual activity: Not on file   Lifestyle    Physical activity     Days per week: Not on file     Minutes per session: Not on file    Stress: Not on file   Relationships    Social connections     Talks on phone: Not on file     Gets together: Not on file     Attends Sabianism service: Not on file     Active member of club or organization: Not on file     Attends meetings of clubs or organizations: Not on file     Relationship status: Not on file    Intimate partner violence     Fear of current or ex partner: Not on file     Emotionally abused: Not on file     Physically abused: Not on file     Forced sexual activity: Not on file   Other Topics Concern    Not on file   Social History Narrative    Not on file         ALLERGIES: Shellfish derived    Review of Systems   Musculoskeletal:        Patient has swollen right second toe consistent with a sausage digit diabetic vascular disease patient's toenails no longer there       Vitals:    09/25/20 2350   BP: (!) 150/84   Pulse: 78   Resp: 16   Temp: 98.8 °F (37.1 °C)   SpO2: 98%   Weight: 158.8 kg (350 lb)   Height: 6' 3\" (1.905 m)            Physical Exam  Vitals signs and nursing note reviewed. Constitutional:       Appearance: He is well-developed. He is not diaphoretic. HENT:      Head: Normocephalic and atraumatic. Eyes:      Conjunctiva/sclera: Conjunctivae normal.      Pupils: Pupils are equal, round, and reactive to light. Neck:      Musculoskeletal: Normal range of motion and neck supple. Cardiovascular:      Rate and Rhythm: Normal rate and regular rhythm. Heart sounds: Normal heart sounds. No murmur. No friction rub. No gallop. Pulmonary:      Effort: Pulmonary effort is normal. No respiratory distress. Breath sounds: Normal breath sounds. No wheezing or rales. Abdominal:      General: Bowel sounds are normal. There is no distension. Palpations: Abdomen is soft. Tenderness: There is no abdominal tenderness. There is no guarding or rebound. Musculoskeletal: Normal range of motion. General: No tenderness. Lymphadenopathy:      Cervical: No cervical adenopathy. Skin:     General: Skin is warm and dry. Findings: No ecchymosis, erythema, lesion or rash. Rash is not urticarial.      Comments: Right second toe swollen distal tip is no longer there patient has sausage digit consistent with osteomyelitis of the toe   Neurological:      Mental Status: He is alert and oriented to person, place, and time. Cranial Nerves: No cranial nerve deficit. Sensory: No sensory deficit.       Coordination: Coordination normal.      Gait: Gait normal.          MDM Procedures    I have reviewed discharge instructions with the patient. The patient verbalized understanding.

## 2020-10-01 DIAGNOSIS — E23.0 HYPOGONADOTROPIC HYPOGONADISM IN MALE (HCC): ICD-10-CM

## 2020-10-01 DIAGNOSIS — E78.2 MIXED HYPERLIPIDEMIA: ICD-10-CM

## 2020-10-01 DIAGNOSIS — Z79.4 TYPE 2 DIABETES MELLITUS WITH HYPERGLYCEMIA, WITH LONG-TERM CURRENT USE OF INSULIN (HCC): ICD-10-CM

## 2020-10-01 DIAGNOSIS — E11.65 TYPE 2 DIABETES MELLITUS WITH HYPERGLYCEMIA, WITH LONG-TERM CURRENT USE OF INSULIN (HCC): ICD-10-CM

## 2020-10-01 DIAGNOSIS — I10 ESSENTIAL HYPERTENSION: ICD-10-CM

## 2020-10-19 LAB
ALBUMIN SERPL-MCNC: 4 G/DL (ref 3.8–4.9)
ALBUMIN/CREAT UR: 27 MG/G CREAT (ref 0–29)
ALBUMIN/GLOB SERPL: 1.6 {RATIO} (ref 1.2–2.2)
ALP SERPL-CCNC: 81 IU/L (ref 39–117)
ALT SERPL-CCNC: 20 IU/L (ref 0–44)
AST SERPL-CCNC: 20 IU/L (ref 0–40)
BILIRUB SERPL-MCNC: 0.4 MG/DL (ref 0–1.2)
BUN SERPL-MCNC: 20 MG/DL (ref 6–24)
BUN/CREAT SERPL: 17 (ref 9–20)
CALCIUM SERPL-MCNC: 8.9 MG/DL (ref 8.7–10.2)
CHLORIDE SERPL-SCNC: 106 MMOL/L (ref 96–106)
CHOLEST SERPL-MCNC: 115 MG/DL (ref 100–199)
CO2 SERPL-SCNC: 26 MMOL/L (ref 20–29)
CREAT SERPL-MCNC: 1.19 MG/DL (ref 0.76–1.27)
CREAT UR-MCNC: 197.5 MG/DL
EST. AVERAGE GLUCOSE BLD GHB EST-MCNC: 174 MG/DL
GLOBULIN SER CALC-MCNC: 2.5 G/DL (ref 1.5–4.5)
GLUCOSE SERPL-MCNC: 121 MG/DL (ref 65–99)
HBA1C MFR BLD: 7.7 % (ref 4.8–5.6)
HDLC SERPL-MCNC: 31 MG/DL
INTERPRETATION, 910389: NORMAL
LDLC SERPL CALC-MCNC: 67 MG/DL (ref 0–99)
Lab: NORMAL
MICROALBUMIN UR-MCNC: 53.8 UG/ML
POTASSIUM SERPL-SCNC: 4.4 MMOL/L (ref 3.5–5.2)
PROT SERPL-MCNC: 6.5 G/DL (ref 6–8.5)
SODIUM SERPL-SCNC: 144 MMOL/L (ref 134–144)
TRIGL SERPL-MCNC: 87 MG/DL (ref 0–149)
VLDLC SERPL CALC-MCNC: 17 MG/DL (ref 5–40)

## 2020-10-23 ENCOUNTER — OFFICE VISIT (OUTPATIENT)
Dept: ENDOCRINOLOGY | Age: 50
End: 2020-10-23
Payer: COMMERCIAL

## 2020-10-23 VITALS
SYSTOLIC BLOOD PRESSURE: 125 MMHG | HEART RATE: 82 BPM | HEIGHT: 73 IN | TEMPERATURE: 98.1 F | WEIGHT: 315 LBS | DIASTOLIC BLOOD PRESSURE: 67 MMHG | RESPIRATION RATE: 16 BRPM | BODY MASS INDEX: 41.75 KG/M2 | OXYGEN SATURATION: 98 %

## 2020-10-23 DIAGNOSIS — E11.65 TYPE 2 DIABETES MELLITUS WITH HYPERGLYCEMIA, WITH LONG-TERM CURRENT USE OF INSULIN (HCC): Primary | ICD-10-CM

## 2020-10-23 DIAGNOSIS — Z79.4 TYPE 2 DIABETES MELLITUS WITH HYPERGLYCEMIA, WITH LONG-TERM CURRENT USE OF INSULIN (HCC): Primary | ICD-10-CM

## 2020-10-23 DIAGNOSIS — I10 ESSENTIAL HYPERTENSION: ICD-10-CM

## 2020-10-23 DIAGNOSIS — E78.2 MIXED HYPERLIPIDEMIA: ICD-10-CM

## 2020-10-23 DIAGNOSIS — E23.0 HYPOGONADOTROPIC HYPOGONADISM IN MALE (HCC): ICD-10-CM

## 2020-10-23 PROCEDURE — 3051F HG A1C>EQUAL 7.0%<8.0%: CPT | Performed by: INTERNAL MEDICINE

## 2020-10-23 PROCEDURE — 99214 OFFICE O/P EST MOD 30 MIN: CPT | Performed by: INTERNAL MEDICINE

## 2020-10-23 PROCEDURE — 95251 CONT GLUC MNTR ANALYSIS I&R: CPT | Performed by: INTERNAL MEDICINE

## 2020-10-23 NOTE — PROGRESS NOTES
Barbie Carl is a 44 y.o. male here for   Chief Complaint   Patient presents with    Diabetes    Hypogonadism       1. Have you been to the ER, urgent care clinic since your last visit? Hospitalized since your last visit? -no    2. Have you seen or consulted any other health care providers outside of the [address] [place] [address] since your last visit?   Include any pap smears or colon screening.-Dr. Carlos Washington

## 2020-10-23 NOTE — PROGRESS NOTES
Lyudmila Teixeira MD          Patient Information  Date:10/23/2020  Name : Lili Kimball 44 y.o.     YOB: 1970         Referred by: None         Chief Complaint   Patient presents with    Diabetes    Hypogonadism       History of Present Illness: Lili Kimball is a 44 y.o. male here for follow-up  Last visit November 2019  Type 2 DM on insulin    He is on 80 units of Tresiba  Weight has been fluctuating  Has freestyle zack  Might miss evening Metformin  No severe hypoglycemia        Day shift - 7 A - 7 PM - 8 AM breakfast , 12 noon lunch , dinner 6 pm  Night shifts -   2 PM - 6 pm - 12 AM     Type 2 diabetes was diagnosed 14 years ago              Wt Readings from Last 3 Encounters:   10/23/20 336 lb (152.4 kg)   09/25/20 350 lb (158.8 kg)   06/18/20 332 lb (150.6 kg)       BP Readings from Last 3 Encounters:   10/23/20 125/67   09/25/20 (!) 150/84   06/18/20 139/79           Past Medical History:   Diagnosis Date    HTN (hypertension)     Hyperlipidemia     Type II or unspecified type diabetes mellitus without mention of complication, uncontrolled      Current Outpatient Medications   Medication Sig    flash glucose sensor (FreeStyle Zack 14 Day Sensor) kit Use as directed every 14 days DX Code: E11.65    flash glucose scanning reader (FreeStyle Zack 14 Day [place]) misc Use as directed daily Dx Code: E11.65    insulin degludec (Tresiba FlexTouch U-200) 200 unit/mL (3 mL) inpn INJECT 80 UNITS SUBCUTANEOUSLY AT NIGHT (Patient taking differently: INJECT 80 UNITS SUBCUTANEOUSLY AT daily)    lisinopril-hydroCHLOROthiazide (PRINZIDE, ZESTORETIC) 20-25 mg per tablet TAKE 1 TABLET BY MOUTH DAILY    Insulin Needles, Disposable, (SAFIA PEN NEEDLE) 32 gauge x 5/32\" ndle Use TID Dx Code: E11.65    dulaglutide (TRULICITY) 1.5 JA/6.5 mL sub-q pen INJECT 0.5ML SUBCUTANEOUSLY EVERY 7 DAYS    atorvastatin (LIPITOR) 10 mg tablet Take 1 Tab by mouth nightly. (Patient taking differently: Take 10 mg by mouth daily.)    metFORMIN ER (GLUCOPHAGE XR) 750 mg tablet Take 1 Tab by mouth Daily (before dinner). (Patient taking differently: Take 750 mg by mouth as needed.)    lancets misc Use to test blood glucose 3 times daily. Dx code E11.65    Blood-Glucose Meter monitoring kit Use to check BG 3 x daily. Dx code E11.65    CHROMIUM PICOLINATE PO Take 1 Tab by mouth daily.  glucose blood VI test strips (ASCENSIA CONTOUR) strip Test Blood Glucose 3 Times Daily     No current facility-administered medications for this visit. Allergies   Allergen Reactions    Shellfish Derived Rash         Review of Systems:  -   - Review of all systems negative other than mentioned in HPI  -     Physical Examination:   Blood pressure 125/67, pulse 82, temperature 98.1 °F (36.7 °C), temperature source Oral, resp. rate 16, height 6' 1\" (1.854 m), weight 336 lb (152.4 kg), SpO2 98 %. Estimated body mass index is 44.33 kg/m² as calculated from the following:    Height as of this encounter: 6' 1\" (1.854 m). -   Weight as of this encounter: 336 lb (152.4 kg).   - General: pleasant, no distress, good eye contact  - HEENT: no exopthalmos, no periorbital edema, no scleral/conjunctival injection, EOMI, no lid lag or stare  - Neck: supple, no thyromegaly,  - Cardiovascular: regular, normal rate, normal S1 and S2,  - Respiratory: clear to auscultation bilaterally, no respiratory distress  - Gastrointestinal: soft, nontender, nondistended,   - Musculoskeletal: no proximal muscle weakness in upper or lower extremities  - Integumentary: no tremors, no edema,  - Neurological:alert and oriented , no focal deficits  - Psychiatric: normal mood and affect   - Skin: Normal turgor, no rash          Diabetic feet exam ; October 2020    H/o partial or complete amputation of foot : No  H/o previous foot ulceration : Yes  H/o pre - ulcerative callus : Yes  H/o peripheral neuropathy and callus : Yes  H/o poor circulation  : No  Foot deformity : Yes: Meenu          Data Reviewed:       Lab Results   Component Value Date/Time    Hemoglobin A1c 7.7 (H) 10/16/2020 12:00 AM    Hemoglobin A1c 7.9 (H) 01/30/2020 12:00 AM    Hemoglobin A1c 7.5 (H) 09/28/2019 12:00 AM    Glucose 121 (H) 10/16/2020 12:00 AM    Glucose  10/21/2016 11:00 AM    Microalb/Creat ratio (ug/mg creat.) 27 10/16/2020 12:00 AM    LDL, calculated 72 01/30/2020 12:00 AM    LDL Chol Calc (NIH) 67 10/16/2020 12:00 AM    Creatinine 1.19 10/16/2020 12:00 AM      Lab Results   Component Value Date/Time    Cholesterol, total 115 10/16/2020 12:00 AM    HDL Cholesterol 31 (L) 10/16/2020 12:00 AM    LDL, calculated 72 01/30/2020 12:00 AM    LDL Chol Calc (NIH) 67 10/16/2020 12:00 AM    Triglyceride 87 10/16/2020 12:00 AM     Lab Results   Component Value Date/Time    ALT (SGPT) 20 10/16/2020 12:00 AM    Alk. phosphatase 81 10/16/2020 12:00 AM    Bilirubin, total 0.4 10/16/2020 12:00 AM     Lab Results   Component Value Date/Time    GFR est AA 81 10/16/2020 12:00 AM    GFR est non-AA 70 10/16/2020 12:00 AM    Creatinine 1.19 10/16/2020 12:00 AM    BUN 20 10/16/2020 12:00 AM    Sodium 144 10/16/2020 12:00 AM    Potassium 4.4 10/16/2020 12:00 AM    Chloride 106 10/16/2020 12:00 AM    CO2 26 10/16/2020 12:00 AM      Lab Results   Component Value Date/Time    TSH 1.060 01/23/2017 09:57 AM        Assessment/Plan:     1.  Type 2 Diabetes Mellitus with neuropathy,  Lab Results   Component Value Date/Time    Hemoglobin A1c 7.7 (H) 10/16/2020 12:00 AM    Hemoglobin A1c (POC) 7.5 06/18/2020 11:43 AM       Metformin extended release 750 mg, could not tolerate higher dose, consistent Metformin  Trulicity   Tresiba  Continuous glucose monitor data downloaded for 2 weeks and reviewed with the patient  Post dinner hyperglycemia, biggest meal is dinner  No hypoglycemia      Advised to check glucose 3 - 4 times daily  FLU annually ,Pneumovax ,aspirin daily,annual eye exam,microalbumin    2. HTN : Continue current therapy    3. Hyperlipidemia : Statin    4. Obesity:Body mass index is 44.33 kg/m². Continue low-carb diet    5  Hypogonadism /ED -  Shift worker         There are no Patient Instructions on file for this visit. Thank you for allowing me to participate in the care of this patient. Karol Solis MD      Patient verbalized understanding    Voice-recognition software was used to generate this report, which may result in some phonetic-based errors in the grammar and contents. Even though attempts were made to correct all the mistakes, some may have been missed and remained in the body of the report.

## 2021-01-26 ENCOUNTER — TRANSCRIBE ORDER (OUTPATIENT)
Dept: ENDOCRINOLOGY | Age: 51
End: 2021-01-26

## 2021-01-26 DIAGNOSIS — I10 ESSENTIAL HYPERTENSION: ICD-10-CM

## 2021-01-26 DIAGNOSIS — E11.65 TYPE 2 DIABETES MELLITUS WITH HYPERGLYCEMIA, WITH LONG-TERM CURRENT USE OF INSULIN (HCC): ICD-10-CM

## 2021-01-26 DIAGNOSIS — Z79.4 TYPE 2 DIABETES MELLITUS WITH HYPERGLYCEMIA, WITH LONG-TERM CURRENT USE OF INSULIN (HCC): ICD-10-CM

## 2021-01-26 DIAGNOSIS — E78.5 HYPERLIPIDEMIA, UNSPECIFIED HYPERLIPIDEMIA TYPE: ICD-10-CM

## 2021-01-26 RX ORDER — DULAGLUTIDE 1.5 MG/.5ML
INJECTION, SOLUTION SUBCUTANEOUS
Qty: 12 EACH | Refills: 4 | Status: SHIPPED | OUTPATIENT
Start: 2021-01-26 | End: 2021-03-02

## 2021-01-26 NOTE — TELEPHONE ENCOUNTER
----- Message from Philippe Dumont sent at 1/26/2021  3:14 PM EST -----  Regarding: Dr. Nikita Hodge Medication Refill    Caller (if not patient):      Relationship of caller (if not patient):      Best contact number(s): [phone]      Name of medication and dosage if known: TRULICITY 1.5 GL/1.6 mL sub-q pen       Is patient out of this medication (yes/no): Yes       Pharmacy name: [facility] [address] listed in chart? (yes/no): Yes    Pharmacy phone number: [phone]      Details to clarify the request:      Philippe Dumont

## 2021-02-11 DIAGNOSIS — E11.65 TYPE 2 DIABETES MELLITUS WITH HYPERGLYCEMIA, UNSPECIFIED WHETHER LONG TERM INSULIN USE (HCC): ICD-10-CM

## 2021-02-11 DIAGNOSIS — I10 ESSENTIAL HYPERTENSION: ICD-10-CM

## 2021-02-11 RX ORDER — FLASH GLUCOSE SENSOR
KIT MISCELLANEOUS
Qty: 2 KIT | Refills: 4 | Status: SHIPPED | OUTPATIENT
Start: 2021-02-11 | End: 2021-07-05

## 2021-02-22 LAB
ALBUMIN SERPL-MCNC: 4.3 G/DL (ref 3.8–4.9)
ALBUMIN/GLOB SERPL: 1.5 {RATIO} (ref 1.2–2.2)
ALP SERPL-CCNC: 95 IU/L (ref 39–117)
ALT SERPL-CCNC: 18 IU/L (ref 0–44)
AST SERPL-CCNC: 15 IU/L (ref 0–40)
BILIRUB SERPL-MCNC: 0.4 MG/DL (ref 0–1.2)
BUN SERPL-MCNC: 22 MG/DL (ref 6–24)
BUN/CREAT SERPL: 19 (ref 9–20)
CALCIUM SERPL-MCNC: 9.3 MG/DL (ref 8.7–10.2)
CHLORIDE SERPL-SCNC: 104 MMOL/L (ref 96–106)
CO2 SERPL-SCNC: 26 MMOL/L (ref 20–29)
CREAT SERPL-MCNC: 1.17 MG/DL (ref 0.76–1.27)
EST. AVERAGE GLUCOSE BLD GHB EST-MCNC: 171 MG/DL
GLOBULIN SER CALC-MCNC: 2.8 G/DL (ref 1.5–4.5)
GLUCOSE SERPL-MCNC: 78 MG/DL (ref 65–99)
HBA1C MFR BLD: 7.6 % (ref 4.8–5.6)
POTASSIUM SERPL-SCNC: 4.5 MMOL/L (ref 3.5–5.2)
PROT SERPL-MCNC: 7.1 G/DL (ref 6–8.5)
SODIUM SERPL-SCNC: 143 MMOL/L (ref 134–144)

## 2021-03-02 ENCOUNTER — OFFICE VISIT (OUTPATIENT)
Dept: ENDOCRINOLOGY | Age: 51
End: 2021-03-02
Payer: COMMERCIAL

## 2021-03-02 VITALS
WEIGHT: 315 LBS | OXYGEN SATURATION: 98 % | HEIGHT: 75 IN | HEART RATE: 78 BPM | TEMPERATURE: 97.2 F | SYSTOLIC BLOOD PRESSURE: 131 MMHG | BODY MASS INDEX: 39.17 KG/M2 | DIASTOLIC BLOOD PRESSURE: 74 MMHG | RESPIRATION RATE: 16 BRPM

## 2021-03-02 DIAGNOSIS — I10 ESSENTIAL HYPERTENSION: ICD-10-CM

## 2021-03-02 DIAGNOSIS — E78.5 HYPERLIPIDEMIA, UNSPECIFIED HYPERLIPIDEMIA TYPE: ICD-10-CM

## 2021-03-02 DIAGNOSIS — E78.2 MIXED HYPERLIPIDEMIA: ICD-10-CM

## 2021-03-02 DIAGNOSIS — E11.65 TYPE 2 DIABETES MELLITUS WITH HYPERGLYCEMIA, WITH LONG-TERM CURRENT USE OF INSULIN (HCC): ICD-10-CM

## 2021-03-02 DIAGNOSIS — Z79.4 TYPE 2 DIABETES MELLITUS WITH HYPERGLYCEMIA, WITH LONG-TERM CURRENT USE OF INSULIN (HCC): Primary | ICD-10-CM

## 2021-03-02 DIAGNOSIS — Z79.4 TYPE 2 DIABETES MELLITUS WITH HYPERGLYCEMIA, WITH LONG-TERM CURRENT USE OF INSULIN (HCC): ICD-10-CM

## 2021-03-02 DIAGNOSIS — E11.65 TYPE 2 DIABETES MELLITUS WITH HYPERGLYCEMIA, WITH LONG-TERM CURRENT USE OF INSULIN (HCC): Primary | ICD-10-CM

## 2021-03-02 PROCEDURE — 95251 CONT GLUC MNTR ANALYSIS I&R: CPT | Performed by: INTERNAL MEDICINE

## 2021-03-02 PROCEDURE — 3051F HG A1C>EQUAL 7.0%<8.0%: CPT | Performed by: INTERNAL MEDICINE

## 2021-03-02 PROCEDURE — 99214 OFFICE O/P EST MOD 30 MIN: CPT | Performed by: INTERNAL MEDICINE

## 2021-03-02 RX ORDER — ATORVASTATIN CALCIUM 10 MG/1
10 TABLET, FILM COATED ORAL
Qty: 90 TAB | Refills: 3 | Status: SHIPPED | OUTPATIENT
Start: 2021-03-02 | End: 2022-08-03

## 2021-03-02 RX ORDER — DULAGLUTIDE 3 MG/.5ML
3 INJECTION, SOLUTION SUBCUTANEOUS
Qty: 12 SYRINGE | Refills: 3 | Status: SHIPPED | OUTPATIENT
Start: 2021-03-02 | End: 2022-03-17

## 2021-03-02 NOTE — PROGRESS NOTES
John Montilla is a 44 y.o. male here for   Chief Complaint   Patient presents with    Diabetes    Hypogonadism       1. Have you been to the ER, urgent care clinic since your last visit? Hospitalized since your last visit? -Mayank King Dec 30th for toe amputation    2. Have you seen or consulted any other health care providers outside of the [address] [place] [address] since your last visit?   Include any pap smears or colon screening.-Dr. Abida Mann

## 2021-03-02 NOTE — PROGRESS NOTES
Shanta Lopez MD          Patient Information  Date:3/2/2021  Name : Jacqueline Alcantar 44 y.o.     YOB: 1970         Referred by: None         Chief Complaint   Patient presents with    Diabetes    Hypogonadism       History of Present Illness: Jacqueline Alcantar is a 44 y.o. male here for follow-up  Last visit November 2019  Type 2 DM on insulin    He is on 80 units of Tresiba  He was out of work for 3 months, lost weight as he was eating less, quit sodas  Has freestyle zack  No severe hypoglycemia  No chest pain  He is back to work now    Day shift - 7 A - 7 PM - 8 AM breakfast , 12 noon lunch , dinner 6 pm  Night shifts -   2 PM - 6 pm - 12 AM     Type 2 diabetes was diagnosed 14 years ago              Wt Readings from Last 3 Encounters:   03/02/21 331 lb (150.1 kg)   10/23/20 336 lb (152.4 kg)   09/25/20 350 lb (158.8 kg)       BP Readings from Last 3 Encounters:   03/02/21 131/74   10/23/20 125/67   09/25/20 (!) 150/84           Past Medical History:   Diagnosis Date    HTN (hypertension)     Hyperlipidemia     Type II or unspecified type diabetes mellitus without mention of complication, uncontrolled      Current Outpatient Medications   Medication Sig    flash glucose sensor (FreeStyle [place] 14 Day Sensor) kit USE AS DIRECTED EVERY 14 DAYS    flash glucose scanning reader (FreeStyle Zack 14 Day [place]) misc Use as directed daily Dx Code: E11.65    insulin degludec (Tresiba FlexTouch U-200) 200 unit/mL (3 mL) inpn INJECT 80 UNITS SUBCUTANEOUSLY AT NIGHT (Patient taking differently: INJECT 80 UNITS SUBCUTANEOUSLY AT daily)    lisinopril-hydroCHLOROthiazide (PRINZIDE, ZESTORETIC) 20-25 mg per tablet TAKE 1 TABLET BY MOUTH DAILY    glucose blood VI test strips (ASCENSIA CONTOUR) strip Test Blood Glucose 3 Times Daily    Insulin Needles, Disposable, (SAFIA PEN NEEDLE) 32 gauge x 5/32\" ndle Use TID Dx Code: E11.65    lancets misc Use to test blood glucose 3 times daily. Dx code E11.65    Blood-Glucose Meter monitoring kit Use to check BG 3 x daily. Dx code E11.65    atorvastatin (LIPITOR) 10 mg tablet Take 1 Tab by mouth nightly.  dulaglutide (Trulicity) 3 BZ/0.4 mL pnij 3 mg by SubCUTAneous route every seven (7) days. Stop 1.5 mg    CHROMIUM PICOLINATE PO Take 1 Tab by mouth daily. No current facility-administered medications for this visit. Allergies   Allergen Reactions    Shellfish Derived Rash         Review of Systems:  -   - Review of all systems negative other than mentioned in HPI  -     Physical Examination:   Blood pressure 131/74, pulse 78, temperature 97.2 °F (36.2 °C), temperature source Oral, resp. rate 16, height 6' 3\" (1.905 m), weight 331 lb (150.1 kg), SpO2 98 %. Estimated body mass index is 41.37 kg/m² as calculated from the following:    Height as of this encounter: 6' 3\" (1.905 m). -   Weight as of this encounter: 331 lb (150.1 kg).   - General: pleasant, no distress, good eye contact  - HEENT: no exopthalmos, no periorbital edema, no scleral/conjunctival injection, EOMI, no lid lag or stare  - Neck: supple, no thyromegaly,  - Cardiovascular: regular, normal rate, normal S1 and S2,  - Respiratory: clear to auscultation bilaterally, no respiratory distress  -   - Musculoskeletal: no proximal muscle weakness in upper or lower extremities  - Integumentary: no tremors, no edema,  - Neurological:alert and oriented , no focal deficits  - Psychiatric: normal mood and affect   - Skin: Normal turgor, no rash          Diabetic feet exam ; October 2020    H/o partial or complete amputation of foot : yes   H/o previous foot ulceration : Yes  H/o pre - ulcerative callus : Yes  H/o peripheral neuropathy and callus : Yes  H/o poor circulation  : No  Foot deformity : Yes: Meenu          Data Reviewed:       Lab Results   Component Value Date/Time    Hemoglobin A1c 7.6 (H) 02/19/2021 12:00 AM    Hemoglobin A1c 7.7 (H) 10/16/2020 12:00 AM    Hemoglobin A1c 7.9 (H) 01/30/2020 12:00 AM    Glucose 78 02/19/2021 12:00 AM    Glucose  10/21/2016 11:00 AM    Microalb/Creat ratio (ug/mg creat.) 27 10/16/2020 12:00 AM    LDL, calculated 67 10/16/2020 12:00 AM    LDL, calculated 72 01/30/2020 12:00 AM    Creatinine 1.17 02/19/2021 12:00 AM      Lab Results   Component Value Date/Time    Cholesterol, total 115 10/16/2020 12:00 AM    HDL Cholesterol 31 (L) 10/16/2020 12:00 AM    LDL, calculated 67 10/16/2020 12:00 AM    LDL, calculated 72 01/30/2020 12:00 AM    Triglyceride 87 10/16/2020 12:00 AM     Lab Results   Component Value Date/Time    ALT (SGPT) 18 02/19/2021 12:00 AM    Alk. phosphatase 95 02/19/2021 12:00 AM    Bilirubin, total 0.4 02/19/2021 12:00 AM     Lab Results   Component Value Date/Time    GFR est AA 82 02/19/2021 12:00 AM    GFR est non-AA 71 02/19/2021 12:00 AM    Creatinine 1.17 02/19/2021 12:00 AM    BUN 22 02/19/2021 12:00 AM    Sodium 143 02/19/2021 12:00 AM    Potassium 4.5 02/19/2021 12:00 AM    Chloride 104 02/19/2021 12:00 AM    CO2 26 02/19/2021 12:00 AM      Lab Results   Component Value Date/Time    TSH 1.060 01/23/2017 09:57 AM        Assessment/Plan:     1. Type 2 Diabetes Mellitus with neuropathy,  Lab Results   Component Value Date/Time    Hemoglobin A1c 7.6 (H) 02/19/2021 12:00 AM    Hemoglobin A1c (POC) 7.5 06/18/2020 11:43 AM       Metformin , could not tolerate   Trulicity 3 mg   Tresiba  Continuous glucose monitor data downloaded for 2 weeks and reviewed with the patient  Post lunch hyperglycemia, no hypoglycemia        Advised to check glucose 3 - 4 times daily  FLU annually ,Pneumovax ,aspirin daily,annual eye exam,microalbumin    2. HTN : Continue current therapy    3. Hyperlipidemia : Statin    4. Obesity:Body mass index is 41.37 kg/m².    Continue low-carb diet    5  Hypogonadism /ED -  Shift worker     Toe amputation right 2 nd toe     There are no Patient Instructions on file for this visit. Follow-up and Dispositions    · Return in about 4 months (around 7/2/2021) for labs before next visit and follow up. Follow-up and Disposition History          Thank you for allowing me to participate in the care of this patient. Carla Mccormick MD      Patient verbalized understanding    Voice-recognition software was used to generate this report, which may result in some phonetic-based errors in the grammar and contents. Even though attempts were made to correct all the mistakes, some may have been missed and remained in the body of the report.

## 2021-07-05 DIAGNOSIS — I10 ESSENTIAL HYPERTENSION: ICD-10-CM

## 2021-07-05 DIAGNOSIS — E11.65 TYPE 2 DIABETES MELLITUS WITH HYPERGLYCEMIA, UNSPECIFIED WHETHER LONG TERM INSULIN USE (HCC): ICD-10-CM

## 2021-07-05 RX ORDER — FLASH GLUCOSE SENSOR
KIT MISCELLANEOUS
Qty: 2 KIT | Refills: 4 | Status: SHIPPED | OUTPATIENT
Start: 2021-07-05 | End: 2022-02-14

## 2021-07-07 ENCOUNTER — OFFICE VISIT (OUTPATIENT)
Dept: ENDOCRINOLOGY | Age: 51
End: 2021-07-07
Payer: COMMERCIAL

## 2021-07-07 VITALS
WEIGHT: 315 LBS | OXYGEN SATURATION: 97 % | SYSTOLIC BLOOD PRESSURE: 120 MMHG | BODY MASS INDEX: 39.17 KG/M2 | HEART RATE: 79 BPM | HEIGHT: 75 IN | DIASTOLIC BLOOD PRESSURE: 65 MMHG | TEMPERATURE: 97.5 F

## 2021-07-07 DIAGNOSIS — E78.2 MIXED HYPERLIPIDEMIA: ICD-10-CM

## 2021-07-07 DIAGNOSIS — E11.65 TYPE 2 DIABETES MELLITUS WITH HYPERGLYCEMIA, UNSPECIFIED WHETHER LONG TERM INSULIN USE (HCC): Primary | ICD-10-CM

## 2021-07-07 DIAGNOSIS — I10 ESSENTIAL HYPERTENSION: ICD-10-CM

## 2021-07-07 PROCEDURE — 3051F HG A1C>EQUAL 7.0%<8.0%: CPT | Performed by: INTERNAL MEDICINE

## 2021-07-07 PROCEDURE — 99214 OFFICE O/P EST MOD 30 MIN: CPT | Performed by: INTERNAL MEDICINE

## 2021-07-07 PROCEDURE — 95251 CONT GLUC MNTR ANALYSIS I&R: CPT | Performed by: INTERNAL MEDICINE

## 2021-07-07 NOTE — PROGRESS NOTES
Felicita Jessica MD          Patient Information  Date:7/7/2021  Name : Ruth Cruz 44 y.o.     YOB: 1970         Referred by: None         Chief Complaint   Patient presents with    Diabetes       History of Present Illness: Ruth Cruz is a 44 y.o. male here for follow-up  Last visit November 2019  Type 2 DM on insulin    He is on 80 units of Tresiba  Reduced sodas, had labs recently  Has freestyle zack  No severe hypoglycemia  No chest pain    Day shift - 7 A - 7 PM - 8 AM breakfast , 12 noon lunch , dinner 6 pm  Night shifts -   2 PM - 6 pm - 12 AM     Type 2 diabetes was diagnosed 14 years ago              Wt Readings from Last 3 Encounters:   07/07/21 337 lb (152.9 kg)   03/02/21 331 lb (150.1 kg)   10/23/20 336 lb (152.4 kg)       BP Readings from Last 3 Encounters:   07/07/21 120/65   03/02/21 131/74   10/23/20 125/67           Past Medical History:   Diagnosis Date    HTN (hypertension)     Hyperlipidemia     Type II or unspecified type diabetes mellitus without mention of complication, uncontrolled      Current Outpatient Medications   Medication Sig    FreeStyle Zack 14 Day Sensor kit USE AS DIRECTED EVERY 14 DAYS    atorvastatin (LIPITOR) 10 mg tablet Take 1 Tab by mouth nightly.  dulaglutide (Trulicity) 3 TE/8.7 mL pnij 3 mg by SubCUTAneous route every seven (7) days. Stop 1.5 mg    flash glucose scanning reader (FreeStyle Zack 14 Day [place]) misc Use as directed daily Dx Code: E11.65    insulin degludec Tiara Velásquez FlexTouch U-200) 200 unit/mL (3 mL) inpn INJECT 80 UNITS SUBCUTANEOUSLY AT NIGHT (Patient taking differently: INJECT 80 UNITS SUBCUTANEOUSLY AT daily)    lisinopril-hydroCHLOROthiazide (PRINZIDE, ZESTORETIC) 20-25 mg per tablet TAKE 1 TABLET BY MOUTH DAILY    glucose blood VI test strips (ASCENSIA CONTOUR) strip Test Blood Glucose 3 Times Daily    Blood-Glucose Meter monitoring kit Use to check BG 3 x daily. Dx code E11.65    CHROMIUM PICOLINATE PO Take 1 Tab by mouth daily. (Patient not taking: Reported on 7/7/2021)    Insulin Needles, Disposable, (SAFIA PEN NEEDLE) 32 gauge x 5/32\" ndle Use TID Dx Code: E11.65    lancets misc Use to test blood glucose 3 times daily. Dx code E11.65     No current facility-administered medications for this visit. Allergies   Allergen Reactions    Shellfish Derived Rash         Review of Systems:  -   - Review of all systems negative other than mentioned in HPI  -     Physical Examination:   Blood pressure 120/65, pulse 79, temperature 97.5 °F (36.4 °C), height 6' 3\" (1.905 m), weight 337 lb (152.9 kg), SpO2 97 %. Estimated body mass index is 42.12 kg/m² as calculated from the following:    Height as of this encounter: 6' 3\" (1.905 m). -   Weight as of this encounter: 337 lb (152.9 kg).   - General: pleasant, no distress, good eye contact  - HEENT: no exopthalmos, no periorbital edema, no scleral/conjunctival injection, EOMI, no lid lag or stare  - Neck: supple, no thyromegaly,  - Cardiovascular: regular, normal rate, normal S1 and S2,  - Respiratory: clear to auscultation bilaterally, no respiratory distress  -   - Musculoskeletal: no proximal muscle weakness in upper or lower extremities  - Integumentary: no tremors, edema  - Neurological:alert and oriented , no focal deficits  - Psychiatric: normal mood and affect   - Skin: Normal turgor, no rash          Diabetic feet exam ; July 2020    H/o partial or complete amputation of foot : yes   H/o previous foot ulceration : Yes  H/o pre - ulcerative callus : Yes  H/o peripheral neuropathy and callus : Yes  H/o poor circulation  : No  Foot deformity : Yes: Meenu          Data Reviewed:       Lab Results   Component Value Date/Time    Hemoglobin A1c 7.6 (H) 07/01/2021 12:00 AM    Hemoglobin A1c 7.6 (H) 02/19/2021 12:00 AM    Hemoglobin A1c 7.7 (H) 10/16/2020 12:00 AM    Glucose 169 (H) 07/01/2021 12:00 AM    Glucose  10/21/2016 11:00 AM    Microalb/Creat ratio (ug/mg creat.) 27 10/16/2020 12:00 AM    LDL,Direct 91 07/01/2021 12:00 AM    LDL, calculated 67 10/16/2020 12:00 AM    LDL, calculated 72 01/30/2020 12:00 AM    Creatinine 1.09 07/01/2021 12:00 AM      Lab Results   Component Value Date/Time    Cholesterol, total 115 10/16/2020 12:00 AM    HDL Cholesterol 31 (L) 10/16/2020 12:00 AM    LDL,Direct 91 07/01/2021 12:00 AM    LDL, calculated 67 10/16/2020 12:00 AM    LDL, calculated 72 01/30/2020 12:00 AM    Triglyceride 87 10/16/2020 12:00 AM     Lab Results   Component Value Date/Time    ALT (SGPT) 18 02/19/2021 12:00 AM    Alk. phosphatase 95 02/19/2021 12:00 AM    Bilirubin, total 0.4 02/19/2021 12:00 AM     Lab Results   Component Value Date/Time    GFR est AA 90 07/01/2021 12:00 AM    GFR est non-AA 78 07/01/2021 12:00 AM    Creatinine 1.09 07/01/2021 12:00 AM    BUN 21 07/01/2021 12:00 AM    Sodium 144 07/01/2021 12:00 AM    Potassium 4.5 07/01/2021 12:00 AM    Chloride 106 07/01/2021 12:00 AM    CO2 24 07/01/2021 12:00 AM      Lab Results   Component Value Date/Time    TSH 1.060 01/23/2017 09:57 AM        Assessment/Plan:     1. Type 2 Diabetes Mellitus with neuropathy,  Lab Results   Component Value Date/Time    Hemoglobin A1c 7.6 (H) 07/01/2021 12:00 AM    Hemoglobin A1c (POC) 7.5 06/18/2020 11:43 AM     He still on 1.5 mg of Trulicity, pharmacy did not give him 3 mg given the prescription was sent, called pharmacy and discussed  Metformin , could not tolerate   Trulicity 3 mg   Tresiba  Continuous glucose monitor data downloaded for 2 weeks and reviewed with the patient  Post breakfast and dinner hyperglycemia, no hypoglycemia        Advised to check glucose 3 - 4 times daily  FLU annually ,Pneumovax ,aspirin daily,annual eye exam,microalbumin    2. HTN : Continue current therapy    3. Hyperlipidemia : Statin    4. Obesity:Body mass index is 42.12 kg/m².    Continue low-carb diet    5  Hypogonadism /ED -  Shift worker     Toe amputation right 2 nd toe     There are no Patient Instructions on file for this visit. Thank you for allowing me to participate in the care of this patient. Terri Glover MD      Patient verbalized understanding    Voice-recognition software was used to generate this report, which may result in some phonetic-based errors in the grammar and contents. Even though attempts were made to correct all the mistakes, some may have been missed and remained in the body of the report.

## 2021-09-07 NOTE — PATIENT INSTRUCTIONS
Check blood sugars before breakfast,dinner and at bedtime. If the bedtime sugars are less than 100 ,eat a 15 gm snack. Weight and diet control.     Metformin  mg BID     Lantus insulin 80 units at bed time    trulicity 1.5 mg       Additional Humalog  with meals if blood sugars are[de-identified]     150-200 mg   3 units   201-250 mg   6 units   251-300 mg   9 units   301-350 mg   12 units   351-400 mg   15 units 08-Mar-2021

## 2021-11-04 LAB
ALBUMIN SERPL-MCNC: 4 G/DL (ref 3.8–4.9)
ALBUMIN/CREAT UR: 23 MG/G CREAT (ref 0–29)
ALBUMIN/GLOB SERPL: 1.3 {RATIO} (ref 1.2–2.2)
ALP SERPL-CCNC: 76 IU/L (ref 44–121)
ALT SERPL-CCNC: 18 IU/L (ref 0–44)
AST SERPL-CCNC: 18 IU/L (ref 0–40)
BILIRUB SERPL-MCNC: 0.5 MG/DL (ref 0–1.2)
BUN SERPL-MCNC: 20 MG/DL (ref 6–24)
BUN/CREAT SERPL: 16 (ref 9–20)
CALCIUM SERPL-MCNC: 8.9 MG/DL (ref 8.7–10.2)
CHLORIDE SERPL-SCNC: 104 MMOL/L (ref 96–106)
CO2 SERPL-SCNC: 27 MMOL/L (ref 20–29)
CREAT SERPL-MCNC: 1.26 MG/DL (ref 0.76–1.27)
CREAT UR-MCNC: 192.3 MG/DL
EST. AVERAGE GLUCOSE BLD GHB EST-MCNC: 171 MG/DL
GLOBULIN SER CALC-MCNC: 3 G/DL (ref 1.5–4.5)
GLUCOSE SERPL-MCNC: 107 MG/DL (ref 65–99)
HBA1C MFR BLD: 7.6 % (ref 4.8–5.6)
MICROALBUMIN UR-MCNC: 44.5 UG/ML
POTASSIUM SERPL-SCNC: 4.4 MMOL/L (ref 3.5–5.2)
PROT SERPL-MCNC: 7 G/DL (ref 6–8.5)
SODIUM SERPL-SCNC: 142 MMOL/L (ref 134–144)

## 2021-12-03 ENCOUNTER — OFFICE VISIT (OUTPATIENT)
Dept: ENDOCRINOLOGY | Age: 51
End: 2021-12-03
Payer: COMMERCIAL

## 2021-12-03 VITALS
TEMPERATURE: 97.8 F | HEIGHT: 75 IN | BODY MASS INDEX: 39.17 KG/M2 | HEART RATE: 69 BPM | DIASTOLIC BLOOD PRESSURE: 66 MMHG | WEIGHT: 315 LBS | OXYGEN SATURATION: 97 % | SYSTOLIC BLOOD PRESSURE: 139 MMHG

## 2021-12-03 DIAGNOSIS — E78.5 HYPERLIPIDEMIA, UNSPECIFIED HYPERLIPIDEMIA TYPE: ICD-10-CM

## 2021-12-03 DIAGNOSIS — I10 ESSENTIAL HYPERTENSION: ICD-10-CM

## 2021-12-03 DIAGNOSIS — E78.2 MIXED HYPERLIPIDEMIA: ICD-10-CM

## 2021-12-03 DIAGNOSIS — Z79.4 UNCONTROLLED TYPE 2 DIABETES MELLITUS WITH HYPERGLYCEMIA, WITH LONG-TERM CURRENT USE OF INSULIN (HCC): ICD-10-CM

## 2021-12-03 DIAGNOSIS — E11.65 UNCONTROLLED TYPE 2 DIABETES MELLITUS WITH HYPERGLYCEMIA, WITH LONG-TERM CURRENT USE OF INSULIN (HCC): ICD-10-CM

## 2021-12-03 DIAGNOSIS — E11.65 TYPE 2 DIABETES MELLITUS WITH HYPERGLYCEMIA, UNSPECIFIED WHETHER LONG TERM INSULIN USE (HCC): Primary | ICD-10-CM

## 2021-12-03 PROCEDURE — 3051F HG A1C>EQUAL 7.0%<8.0%: CPT | Performed by: INTERNAL MEDICINE

## 2021-12-03 PROCEDURE — 99214 OFFICE O/P EST MOD 30 MIN: CPT | Performed by: INTERNAL MEDICINE

## 2021-12-03 RX ORDER — DICLOFENAC SODIUM 75 MG/1
75 TABLET, DELAYED RELEASE ORAL 2 TIMES DAILY
COMMUNITY
Start: 2021-11-30

## 2021-12-03 RX ORDER — LISINOPRIL AND HYDROCHLOROTHIAZIDE 20; 25 MG/1; MG/1
1 TABLET ORAL DAILY
Qty: 90 TABLET | Refills: 3 | Status: SHIPPED | OUTPATIENT
Start: 2021-12-03

## 2021-12-03 NOTE — PROGRESS NOTES
Elizabeth Rosado is a [address] y.o. male here for   Chief Complaint   Patient presents with    Diabetes       1. Have you been to the ER, urgent care clinic since your last visit? Hospitalized since your last visit? - no      2. Have you seen or consulted any other health care providers outside of the [address] [place] [address] since your last visit?   Include any pap smears or colon screening.-no [facility] Dr. Jordan Ryan on Monday Nov 29th

## 2021-12-03 NOTE — PROGRESS NOTES
Jeramy Mcdowell MD          Patient Information  Date:12/3/2021  Name : Griselda Nava 46 y.o.     YOB: 1970         Referred by: None         Chief Complaint   Patient presents with    Diabetes       History of Present Illness: Griselda Nava is a 46 y.o. male here for follow-up  Last visit November 2019  Type 2 DM on insulin  Gained weight  He is on 80 units of Tresiba  Continue intermittently at breakfast,  Has freestyle zack  No severe hypoglycemia  No chest pain    Day shift - 7 A - 7 PM - 8 AM breakfast , 12 noon lunch , dinner 6 pm  Night shifts -   2 PM - 6 pm - 12 AM     Type 2 diabetes was diagnosed 14 years ago              Wt Readings from Last 3 Encounters:   12/03/21 343 lb 12.8 oz (155.9 kg)   07/07/21 337 lb (152.9 kg)   03/02/21 331 lb (150.1 kg)       BP Readings from Last 3 Encounters:   12/03/21 139/66   07/07/21 120/65   03/02/21 131/74           Past Medical History:   Diagnosis Date    HTN (hypertension)     Hyperlipidemia     Type II or unspecified type diabetes mellitus without mention of complication, uncontrolled      Current Outpatient Medications   Medication Sig    diclofenac EC (VOLTAREN) 75 mg EC tablet Take 75 mg by mouth two (2) times a day.  FreeStyle Zack 14 Day Sensor kit USE AS DIRECTED EVERY 14 DAYS    dulaglutide (Trulicity) 3 LA/7.2 mL pnij 3 mg by SubCUTAneous route every seven (7) days.  Stop 1.5 mg    flash glucose scanning reader (FreeStyle Zack 14 Day [place]) misc Use as directed daily Dx Code: E11.65    insulin degludec Derrek Ada FlexTouch U-200) 200 unit/mL (3 mL) inpn INJECT 80 UNITS SUBCUTANEOUSLY AT NIGHT (Patient taking differently: INJECT 80 UNITS SUBCUTANEOUSLY AT daily)    lisinopril-hydroCHLOROthiazide (PRINZIDE, ZESTORETIC) 20-25 mg per tablet TAKE 1 TABLET BY MOUTH DAILY    Insulin Needles, Disposable, (SAFIA PEN NEEDLE) 32 gauge x 5/32\" ndle Use TID Dx Code: E11.65    lancets misc Use to test blood glucose 3 times daily. Dx code E11.65    Blood-Glucose Meter monitoring kit Use to check BG 3 x daily. Dx code E11.65    atorvastatin (LIPITOR) 10 mg tablet Take 1 Tab by mouth nightly. (Patient not taking: Reported on 12/3/2021)    CHROMIUM PICOLINATE PO Take 1 Tab by mouth daily. (Patient not taking: Reported on 7/7/2021)    glucose blood VI test strips (ASCENSIA CONTOUR) strip Test Blood Glucose 3 Times Daily (Patient not taking: Reported on 12/3/2021)     No current facility-administered medications for this visit. Allergies   Allergen Reactions    Shellfish Derived Rash         Review of Systems:  -   - Review of all systems negative other than mentioned in HPI  -     Physical Examination:   Blood pressure 139/66, pulse 69, temperature 97.8 °F (36.6 °C), temperature source Temporal, height 6' 3\" (1.905 m), weight 343 lb 12.8 oz (155.9 kg), SpO2 97 %. Estimated body mass index is 42.97 kg/m² as calculated from the following:    Height as of this encounter: 6' 3\" (1.905 m). -   Weight as of this encounter: 343 lb 12.8 oz (155.9 kg).   - General: pleasant, no distress, good eye contact  - HEENT: no exopthalmos, no periorbital edema, no scleral/conjunctival injection, EOMI, no lid lag or stare  - Neck: supple, no thyromegaly,  - Cardiovascular: regular, normal rate, normal S1 and S2,  - Respiratory: clear to auscultation bilaterally, no respiratory distress  -   - Musculoskeletal: no proximal muscle weakness in upper or lower extremities  - Integumentary: no tremors, edema  - Neurological:alert and oriented , no focal deficits  - Psychiatric: normal mood and affect   - Skin: Normal turgor, no rash          Diabetic feet exam ; July 2020    H/o partial or complete amputation of foot : yes   H/o previous foot ulceration : Yes  H/o pre - ulcerative callus : Yes  H/o peripheral neuropathy and callus : Yes  H/o poor circulation  : No  Foot deformity : Yes: Meenu Liu Reviewed:       Lab Results   Component Value Date/Time    Hemoglobin A1c 7.6 (H) 11/03/2021 12:00 AM    Hemoglobin A1c 7.6 (H) 07/01/2021 12:00 AM    Hemoglobin A1c 7.6 (H) 02/19/2021 12:00 AM    Glucose 107 (H) 11/03/2021 12:00 AM    Glucose  10/21/2016 11:00 AM    Microalb/Creat ratio (ug/mg creat.) 23 11/03/2021 12:00 AM    LDL,Direct 91 07/01/2021 12:00 AM    LDL, calculated 67 10/16/2020 12:00 AM    LDL, calculated 72 01/30/2020 12:00 AM    Creatinine 1.26 11/03/2021 12:00 AM      Lab Results   Component Value Date/Time    Cholesterol, total 115 10/16/2020 12:00 AM    HDL Cholesterol 31 (L) 10/16/2020 12:00 AM    LDL,Direct 91 07/01/2021 12:00 AM    LDL, calculated 67 10/16/2020 12:00 AM    LDL, calculated 72 01/30/2020 12:00 AM    Triglyceride 87 10/16/2020 12:00 AM     Lab Results   Component Value Date/Time    ALT (SGPT) 18 11/03/2021 12:00 AM    Alk. phosphatase 76 11/03/2021 12:00 AM    Bilirubin, total 0.5 11/03/2021 12:00 AM     Lab Results   Component Value Date/Time    GFR est AA 75 11/03/2021 12:00 AM    GFR est non-AA 65 11/03/2021 12:00 AM    Creatinine 1.26 11/03/2021 12:00 AM    BUN 20 11/03/2021 12:00 AM    Sodium 142 11/03/2021 12:00 AM    Potassium 4.4 11/03/2021 12:00 AM    Chloride 104 11/03/2021 12:00 AM    CO2 27 11/03/2021 12:00 AM      Lab Results   Component Value Date/Time    TSH 1.060 01/23/2017 09:57 AM        Assessment/Plan:     1.  Type 2 Diabetes Mellitus with neuropathy,retinopathy   Lab Results   Component Value Date/Time    Hemoglobin A1c 7.6 (H) 11/03/2021 12:00 AM    Hemoglobin A1c (POC) 7.5 06/18/2020 11:43 AM     Stable  Metformin , could not tolerate   Trulicity 3 mg   Tresiba  Continuous glucose monitor data downloaded for 2 weeks and reviewed with the patient  Post breakfast and dinner hyperglycemia as it was last visit, no hypoglycemia  Avoid [facility] with breakfast  If no improvement next visit SGLT2 inhibitors versus repaglinide      Check blood glucose 4 times daily  FLU annually ,Pneumovax ,aspirin daily,annual eye exam,microalbumin    2. HTN : Continue current therapy    3. Hyperlipidemia : Statin    4. Obesity:Body mass index is 42.97 kg/m². Continue low-carb diet    5  Hypogonadism /ED -  Shift worker     Toe amputation right 2 nd toe     There are no Patient Instructions on file for this visit. Thank you for allowing me to participate in the care of this patient. Kim Ferreira MD      Patient verbalized understanding    Voice-recognition software was used to generate this report, which may result in some phonetic-based errors in the grammar and contents. Even though attempts were made to correct all the mistakes, some may have been missed and remained in the body of the report.

## 2022-02-14 DIAGNOSIS — I10 ESSENTIAL HYPERTENSION: ICD-10-CM

## 2022-02-14 DIAGNOSIS — E11.65 TYPE 2 DIABETES MELLITUS WITH HYPERGLYCEMIA, UNSPECIFIED WHETHER LONG TERM INSULIN USE (HCC): ICD-10-CM

## 2022-02-14 RX ORDER — FLASH GLUCOSE SENSOR
KIT MISCELLANEOUS
Qty: 2 KIT | Refills: 4 | Status: SHIPPED | OUTPATIENT
Start: 2022-02-14 | End: 2022-08-02

## 2022-03-17 DIAGNOSIS — Z79.4 TYPE 2 DIABETES MELLITUS WITH HYPERGLYCEMIA, WITH LONG-TERM CURRENT USE OF INSULIN (HCC): ICD-10-CM

## 2022-03-17 DIAGNOSIS — I10 ESSENTIAL HYPERTENSION: ICD-10-CM

## 2022-03-17 DIAGNOSIS — E78.5 HYPERLIPIDEMIA, UNSPECIFIED HYPERLIPIDEMIA TYPE: ICD-10-CM

## 2022-03-17 DIAGNOSIS — E11.65 TYPE 2 DIABETES MELLITUS WITH HYPERGLYCEMIA, WITH LONG-TERM CURRENT USE OF INSULIN (HCC): ICD-10-CM

## 2022-03-17 RX ORDER — DULAGLUTIDE 3 MG/.5ML
INJECTION, SOLUTION SUBCUTANEOUS
Qty: 6 EACH | Refills: 5 | Status: SHIPPED | OUTPATIENT
Start: 2022-03-17 | End: 2022-04-22 | Stop reason: ALTCHOICE

## 2022-03-19 PROBLEM — Z79.4 TYPE 2 DIABETES MELLITUS WITH HYPERGLYCEMIA, WITH LONG-TERM CURRENT USE OF INSULIN (HCC): Status: ACTIVE | Noted: 2017-01-27

## 2022-03-19 PROBLEM — E11.21 TYPE 2 DIABETES WITH NEPHROPATHY (HCC): Status: ACTIVE | Noted: 2018-04-24

## 2022-03-19 PROBLEM — E11.65 TYPE 2 DIABETES MELLITUS WITH HYPERGLYCEMIA, WITH LONG-TERM CURRENT USE OF INSULIN (HCC): Status: ACTIVE | Noted: 2017-01-27

## 2022-03-20 PROBLEM — E23.0 HYPOGONADOTROPIC HYPOGONADISM IN MALE (HCC): Status: ACTIVE | Noted: 2017-01-28

## 2022-04-09 LAB
EST. AVERAGE GLUCOSE BLD GHB EST-MCNC: 171 MG/DL
HBA1C MFR BLD: 7.6 % (ref 4.8–5.6)

## 2022-04-21 ENCOUNTER — OFFICE VISIT (OUTPATIENT)
Dept: ENDOCRINOLOGY | Age: 52
End: 2022-04-21
Payer: COMMERCIAL

## 2022-04-21 VITALS
TEMPERATURE: 97.3 F | OXYGEN SATURATION: 99 % | RESPIRATION RATE: 20 BRPM | WEIGHT: 315 LBS | HEIGHT: 75 IN | DIASTOLIC BLOOD PRESSURE: 66 MMHG | SYSTOLIC BLOOD PRESSURE: 124 MMHG | HEART RATE: 76 BPM | BODY MASS INDEX: 39.17 KG/M2

## 2022-04-21 DIAGNOSIS — E11.65 TYPE 2 DIABETES MELLITUS WITH HYPERGLYCEMIA, WITH LONG-TERM CURRENT USE OF INSULIN (HCC): Primary | ICD-10-CM

## 2022-04-21 DIAGNOSIS — Z79.4 TYPE 2 DIABETES MELLITUS WITH HYPERGLYCEMIA, WITH LONG-TERM CURRENT USE OF INSULIN (HCC): Primary | ICD-10-CM

## 2022-04-21 DIAGNOSIS — E23.0 HYPOGONADOTROPIC HYPOGONADISM IN MALE (HCC): ICD-10-CM

## 2022-04-21 DIAGNOSIS — E78.2 MIXED HYPERLIPIDEMIA: ICD-10-CM

## 2022-04-21 DIAGNOSIS — E11.65 TYPE 2 DIABETES MELLITUS WITH HYPERGLYCEMIA, WITH LONG-TERM CURRENT USE OF INSULIN (HCC): ICD-10-CM

## 2022-04-21 DIAGNOSIS — I10 ESSENTIAL HYPERTENSION: ICD-10-CM

## 2022-04-21 DIAGNOSIS — Z79.4 TYPE 2 DIABETES MELLITUS WITH HYPERGLYCEMIA, WITH LONG-TERM CURRENT USE OF INSULIN (HCC): ICD-10-CM

## 2022-04-21 PROCEDURE — 99214 OFFICE O/P EST MOD 30 MIN: CPT | Performed by: INTERNAL MEDICINE

## 2022-04-21 PROCEDURE — 95251 CONT GLUC MNTR ANALYSIS I&R: CPT | Performed by: INTERNAL MEDICINE

## 2022-04-21 PROCEDURE — 3051F HG A1C>EQUAL 7.0%<8.0%: CPT | Performed by: INTERNAL MEDICINE

## 2022-04-21 NOTE — PROGRESS NOTES
Lyndsay Beaulieu is a 46 y.o. male here for   Chief Complaint   Patient presents with    Diabetes    Hypogonadism       1. Have you been to the ER, urgent care clinic since your last visit? Hospitalized since your last visit? -no    2. Have you seen or consulted any other health care providers outside of the [address] [place] [address] since your last visit?   Include any pap smears or colon screening.-no

## 2022-04-21 NOTE — PROGRESS NOTES
Ijeoma Pulido MD          Patient Information  Date:4/21/2022  Name : Juni Dutton 46 y.o.     YOB: 1970         Referred by: None         Chief Complaint   Patient presents with    Diabetes    Hypogonadism       History of Present Illness: uJni Dutton is a 46 y.o. male here for follow-up  Last visit November 2019  Type 2 DM on insulin  Lost weight  He is on 80 units of Tresiba  BG high at breakfast,  Has freestyle zack  No severe hypoglycemia  No chest pain    Day shift - 7 A - 7 PM - 8 AM breakfast , 12 noon lunch , dinner 6 pm  Night shifts -   2 PM - 6 pm - 12 AM     Type 2 diabetes was diagnosed 14 years ago              Wt Readings from Last 3 Encounters:   04/21/22 335 lb (152 kg)   12/03/21 343 lb 12.8 oz (155.9 kg)   07/07/21 337 lb (152.9 kg)       BP Readings from Last 3 Encounters:   04/21/22 124/66   12/03/21 139/66   07/07/21 120/65           Past Medical History:   Diagnosis Date    HTN (hypertension)     Hyperlipidemia     Type II or unspecified type diabetes mellitus without mention of complication, uncontrolled      Current Outpatient Medications   Medication Sig    Trulicity 3 AY/0.0 mL pnij INJECT 3 MG (0.5 MILLILITERS) SUBCUTANEOUS EVERY 7 DAYS, STOP 1.5MG    FreeStyle Zack 14 Day Sensor kit USE AS DIRECTED EVERY 14 DAYS    lisinopril-hydroCHLOROthiazide (PRINZIDE, ZESTORETIC) 20-25 mg per tablet Take 1 Tablet by mouth daily.  flash glucose scanning reader (FreeStyle Zack 14 Day [place]) misc Use as directed daily Dx Code: E11.65    insulin degludec Aron Conrado FlexTouch U-200) 200 unit/mL (3 mL) inpn INJECT 80 UNITS SUBCUTANEOUSLY AT NIGHT (Patient taking differently: INJECT 80 UNITS SUBCUTANEOUSLY AT daily)    Insulin Needles, Disposable, (SAFIA PEN NEEDLE) 32 gauge x 5/32\" ndle Use TID Dx Code: E11.65    lancets misc Use to test blood glucose 3 times daily.  Dx code E11.65    Blood-Glucose Meter monitoring kit Use to check BG 3 x daily. Dx code E11.65    diclofenac EC (VOLTAREN) 75 mg EC tablet Take 75 mg by mouth two (2) times a day. (Patient not taking: Reported on 4/21/2022)    atorvastatin (LIPITOR) 10 mg tablet Take 1 Tab by mouth nightly. (Patient not taking: Reported on 12/3/2021)    CHROMIUM PICOLINATE PO Take 1 Tab by mouth daily. (Patient not taking: Reported on 7/7/2021)    glucose blood VI test strips (ASCENSIA CONTOUR) strip Test Blood Glucose 3 Times Daily (Patient not taking: Reported on 12/3/2021)     No current facility-administered medications for this visit. Allergies   Allergen Reactions    Shellfish Derived Rash         Review of Systems:  -   - Per HPI   -     Physical Examination:   Blood pressure 124/66, pulse 76, temperature 97.3 °F (36.3 °C), temperature source Temporal, resp. rate 20, height 6' 3\" (1.905 m), weight 335 lb (152 kg), SpO2 99 %. Estimated body mass index is 41.87 kg/m² as calculated from the following:    Height as of this encounter: 6' 3\" (1.905 m). -   Weight as of this encounter: 335 lb (152 kg).   - General: pleasant, no distress, good eye contact  - HEENT: no exopthalmos, no periorbital edema, no scleral/conjunctival injection, EOMI, no lid lag or stare  - Neck: supple, no thyromegaly,  - Cardiovascular: regular, normal rate, normal S1 and S2,  - Respiratory: clear to auscultation bilaterally, no respiratory distress  -   - Musculoskeletal: no proximal muscle weakness in upper or lower extremities  - Integumentary: no tremors, edema  - Neurological:alert and oriented , no focal deficits  - Psychiatric: normal mood and affect   - Skin: Normal turgor, no rash          Diabetic feet exam ; July 2020    H/o partial or complete amputation of foot : yes   H/o previous foot ulceration : Yes  H/o pre - ulcerative callus : Yes  H/o peripheral neuropathy and callus : Yes  H/o poor circulation  : No  Foot deformity : Yes: Meenu          Data Reviewed:       Lab Results Component Value Date/Time    Hemoglobin A1c 7.6 (H) 04/08/2022 12:00 AM    Hemoglobin A1c 7.6 (H) 11/03/2021 12:00 AM    Hemoglobin A1c 7.6 (H) 07/01/2021 12:00 AM    Glucose 107 (H) 11/03/2021 12:00 AM    Glucose  10/21/2016 11:00 AM    Microalb/Creat ratio (ug/mg creat.) 23 11/03/2021 12:00 AM    LDL,Direct 91 07/01/2021 12:00 AM    LDL, calculated 67 10/16/2020 12:00 AM    LDL, calculated 72 01/30/2020 12:00 AM    Creatinine 1.26 11/03/2021 12:00 AM      Lab Results   Component Value Date/Time    Cholesterol, total 115 10/16/2020 12:00 AM    HDL Cholesterol 31 (L) 10/16/2020 12:00 AM    LDL,Direct 91 07/01/2021 12:00 AM    LDL, calculated 67 10/16/2020 12:00 AM    LDL, calculated 72 01/30/2020 12:00 AM    Triglyceride 87 10/16/2020 12:00 AM     Lab Results   Component Value Date/Time    ALT (SGPT) 18 11/03/2021 12:00 AM    Alk. phosphatase 76 11/03/2021 12:00 AM    Bilirubin, total 0.5 11/03/2021 12:00 AM     Lab Results   Component Value Date/Time    GFR est AA 75 11/03/2021 12:00 AM    GFR est non-AA 65 11/03/2021 12:00 AM    Creatinine 1.26 11/03/2021 12:00 AM    BUN 20 11/03/2021 12:00 AM    Sodium 142 11/03/2021 12:00 AM    Potassium 4.4 11/03/2021 12:00 AM    Chloride 104 11/03/2021 12:00 AM    CO2 27 11/03/2021 12:00 AM      Lab Results   Component Value Date/Time    TSH 1.060 01/23/2017 09:57 AM        Assessment/Plan:     1.  Type 2 Diabetes Mellitus with neuropathy,retinopathy   Lab Results   Component Value Date/Time    Hemoglobin A1c 7.6 (H) 04/08/2022 12:00 AM    Hemoglobin A1c (POC) 7.5 06/18/2020 11:43 AM     Stable  Metformin , could not tolerate   Trulicity 4.5 mg  Tresiba  Continuous glucose monitor data downloaded for 2 weeks and reviewed with the patient  Post breakfast  hyperglycemia ,post dinner hyperglycemia improved , no hypoglycemia  Avoid [facility] with breakfast  If no improvement next visit SGLT2 inhibitors versus repaglinide      Check blood glucose 4 times daily  FLU annually ,Pneumovax ,aspirin daily,annual eye exam,microalbumin    2. HTN : Continue current therapy    3. Hyperlipidemia : Statin    4. Obesity:Body mass index is 41.87 kg/m². Continue low-carb diet    5  Hypogonadism /ED -  Shift worker     Toe amputation right 2 nd toe     There are no Patient Instructions on file for this visit. Thank you for allowing me to participate in the care of this patient. Jules Coats MD      Patient verbalized understanding    Voice-recognition software was used to generate this report, which may result in some phonetic-based errors in the grammar and contents. Even though attempts were made to correct all the mistakes, some may have been missed and remained in the body of the report.

## 2022-04-22 RX ORDER — INSULIN DEGLUDEC INJECTION 200 U/ML
INJECTION, SOLUTION SUBCUTANEOUS
Qty: 36 ML | Refills: 3 | Status: SHIPPED | OUTPATIENT
Start: 2022-04-22

## 2022-04-22 RX ORDER — DULAGLUTIDE 4.5 MG/.5ML
4.5 INJECTION, SOLUTION SUBCUTANEOUS
Qty: 12 EACH | Refills: 3 | Status: SHIPPED | OUTPATIENT
Start: 2022-04-22

## 2022-07-28 LAB
BUN SERPL-MCNC: 17 MG/DL (ref 6–24)
BUN/CREAT SERPL: 15 (ref 9–20)
CALCIUM SERPL-MCNC: 9.5 MG/DL (ref 8.7–10.2)
CHLORIDE SERPL-SCNC: 102 MMOL/L (ref 96–106)
CO2 SERPL-SCNC: 25 MMOL/L (ref 20–29)
CREAT SERPL-MCNC: 1.17 MG/DL (ref 0.76–1.27)
EGFR: 75 ML/MIN/1.73
EST. AVERAGE GLUCOSE BLD GHB EST-MCNC: 174 MG/DL
GLUCOSE SERPL-MCNC: 103 MG/DL (ref 65–99)
HBA1C MFR BLD: 7.7 % (ref 4.8–5.6)
LDLC SERPL DIRECT ASSAY-MCNC: 123 MG/DL (ref 0–99)
POTASSIUM SERPL-SCNC: 4.4 MMOL/L (ref 3.5–5.2)
SODIUM SERPL-SCNC: 141 MMOL/L (ref 134–144)

## 2022-08-02 DIAGNOSIS — E11.65 TYPE 2 DIABETES MELLITUS WITH HYPERGLYCEMIA, UNSPECIFIED WHETHER LONG TERM INSULIN USE (HCC): ICD-10-CM

## 2022-08-02 DIAGNOSIS — I10 ESSENTIAL HYPERTENSION: ICD-10-CM

## 2022-08-02 RX ORDER — FLASH GLUCOSE SENSOR
KIT MISCELLANEOUS
Qty: 2 KIT | Refills: 4 | Status: SHIPPED | OUTPATIENT
Start: 2022-08-02

## 2022-08-03 ENCOUNTER — OFFICE VISIT (OUTPATIENT)
Dept: ENDOCRINOLOGY | Age: 52
End: 2022-08-03
Payer: COMMERCIAL

## 2022-08-03 VITALS
BODY MASS INDEX: 39.17 KG/M2 | SYSTOLIC BLOOD PRESSURE: 127 MMHG | WEIGHT: 315 LBS | HEART RATE: 80 BPM | HEIGHT: 75 IN | OXYGEN SATURATION: 96 % | DIASTOLIC BLOOD PRESSURE: 77 MMHG | TEMPERATURE: 98 F

## 2022-08-03 DIAGNOSIS — Z79.4 TYPE 2 DIABETES MELLITUS WITH HYPERGLYCEMIA, WITH LONG-TERM CURRENT USE OF INSULIN (HCC): Primary | ICD-10-CM

## 2022-08-03 DIAGNOSIS — E11.65 TYPE 2 DIABETES MELLITUS WITH HYPERGLYCEMIA, WITH LONG-TERM CURRENT USE OF INSULIN (HCC): Primary | ICD-10-CM

## 2022-08-03 DIAGNOSIS — E78.2 MIXED HYPERLIPIDEMIA: ICD-10-CM

## 2022-08-03 DIAGNOSIS — I10 ESSENTIAL HYPERTENSION: ICD-10-CM

## 2022-08-03 PROCEDURE — 95251 CONT GLUC MNTR ANALYSIS I&R: CPT | Performed by: INTERNAL MEDICINE

## 2022-08-03 PROCEDURE — 99214 OFFICE O/P EST MOD 30 MIN: CPT | Performed by: INTERNAL MEDICINE

## 2022-08-03 PROCEDURE — 3051F HG A1C>EQUAL 7.0%<8.0%: CPT | Performed by: INTERNAL MEDICINE

## 2022-08-03 RX ORDER — FLASH GLUCOSE SENSOR
KIT MISCELLANEOUS
Qty: 2 KIT | Refills: 11 | Status: SHIPPED | OUTPATIENT
Start: 2022-08-03

## 2022-08-03 RX ORDER — ATORVASTATIN CALCIUM 10 MG/1
10 TABLET, FILM COATED ORAL
Qty: 90 TABLET | Refills: 3 | Status: SHIPPED | OUTPATIENT
Start: 2022-08-03

## 2022-08-03 NOTE — PROGRESS NOTES
Eric Whitaker MD          Patient Information  Date:8/3/2022  Name : Gillian Mo 46 y.o.     YOB: 1970         Referred by: None         Chief Complaint   Patient presents with    Hypogonadism    Diabetes       History of Present Illness: Gillian Mo is a 46 y.o. male here for follow-up  Last visit November 2019  Type 2 DM on insulin  Lost weight  He is on 80 units of Tresiba  Checking blood glucose 3-4 times daily  Has freestyle zack  No severe hypoglycemia  No chest pain    Day shift - 7 A - 7 PM - 8 AM breakfast , 12 noon lunch , dinner 6 pm  Night shifts -   2 PM - 6 pm - 12 AM     Type 2 diabetes was diagnosed 14 years ago              Wt Readings from Last 3 Encounters:   08/03/22 323 lb 14.4 oz (146.9 kg)   04/21/22 335 lb (152 kg)   12/03/21 343 lb 12.8 oz (155.9 kg)       BP Readings from Last 3 Encounters:   08/03/22 127/77   04/21/22 124/66   12/03/21 139/66           Past Medical History:   Diagnosis Date    HTN (hypertension)     Hyperlipidemia     Type II or unspecified type diabetes mellitus without mention of complication, uncontrolled      Current Outpatient Medications   Medication Sig    FreeStyle Zack 14 Day Sensor kit USE AS DIRECTED EVERY 14 DAYS    dulaglutide (Trulicity) 4.5 MH/9.7 mL pnij 4.5 mg by SubCUTAneous route every seven (7) days. Stop 3 mg    insulin degludec [place] FlexTouch U-200) 200 unit/mL (3 mL) inpn pen INJECT 80 UNITS SUBCUTANEOUSLY AT NIGHT    lisinopril-hydroCHLOROthiazide (PRINZIDE, ZESTORETIC) 20-25 mg per tablet Take 1 Tablet by mouth daily. flash glucose scanning reader (FreeStyle Zack 14 Day [place]) misc Use as directed daily Dx Code: E11.65    Insulin Needles, Disposable, (SAFIA PEN NEEDLE) 32 gauge x 5/32\" ndle Use TID Dx Code: E11.65    lancets misc Use to test blood glucose 3 times daily.  Dx code E11.65    diclofenac EC (VOLTAREN) 75 mg EC tablet Take 75 mg by mouth two (2) times a day. (Patient not taking: No sig reported)    CHROMIUM PICOLINATE PO Take 1 Tab by mouth daily. (Patient not taking: No sig reported)    glucose blood VI test strips (ASCENSIA CONTOUR) strip Test Blood Glucose 3 Times Daily (Patient not taking: No sig reported)    Blood-Glucose Meter monitoring kit Use to check BG 3 x daily. Dx code E11.65     No current facility-administered medications for this visit. Allergies   Allergen Reactions    Shellfish Derived Rash         Review of Systems:    Per HPI       Physical Examination:   Blood pressure 127/77, pulse 80, temperature 98 °F (36.7 °C), temperature source Temporal, height 6' 3\" (1.905 m), weight 323 lb 14.4 oz (146.9 kg), SpO2 96 %. Estimated body mass index is 40.48 kg/m² as calculated from the following:    Height as of this encounter: 6' 3\" (1.905 m). Weight as of this encounter: 323 lb 14.4 oz (146.9 kg).   General: pleasant, no distress, good eye contact  HEENT: no exopthalmos, no periorbital edema, no scleral/conjunctival injection, EOMI, no lid lag or stare  Neck: supple, no thyromegaly,  Cardiovascular: regular, normal rate, normal S1 and S2,  Respiratory: clear to auscultation bilaterally, no respiratory distress    Musculoskeletal: no proximal muscle weakness in upper or lower extremities  Integumentary: no tremors, edema  Neurological:alert and oriented , no focal deficits  Psychiatric: normal mood and affect   Skin: Normal turgor, no rash          Diabetic feet exam ; July 2020    H/o partial or complete amputation of foot : yes   H/o previous foot ulceration : Yes  H/o pre - ulcerative callus : Yes  H/o peripheral neuropathy and callus : Yes  H/o poor circulation  : No  Foot deformity : Yes: Meenu          Data Reviewed:       Lab Results   Component Value Date/Time    Hemoglobin A1c 7.7 (H) 07/26/2022 03:15 PM    Hemoglobin A1c 7.6 (H) 04/08/2022 12:00 AM    Hemoglobin A1c 7.6 (H) 11/03/2021 12:00 AM    Glucose 103 (H) 07/26/2022 03:15 PM Glucose  10/21/2016 11:00 AM    Microalb/Creat ratio (ug/mg creat.) 23 11/03/2021 12:00 AM    LDL,Direct 123 (H) 07/26/2022 03:15 PM    LDL, calculated 67 10/16/2020 12:00 AM    LDL, calculated 72 01/30/2020 12:00 AM    Creatinine 1.17 07/26/2022 03:15 PM      Lab Results   Component Value Date/Time    Cholesterol, total 115 10/16/2020 12:00 AM    HDL Cholesterol 31 (L) 10/16/2020 12:00 AM    LDL,Direct 123 (H) 07/26/2022 03:15 PM    LDL, calculated 67 10/16/2020 12:00 AM    LDL, calculated 72 01/30/2020 12:00 AM    Triglyceride 87 10/16/2020 12:00 AM     Lab Results   Component Value Date/Time    ALT (SGPT) 18 11/03/2021 12:00 AM    Alk. phosphatase 76 11/03/2021 12:00 AM    Bilirubin, total 0.5 11/03/2021 12:00 AM     Lab Results   Component Value Date/Time    GFR est AA 75 11/03/2021 12:00 AM    GFR est non-AA 65 11/03/2021 12:00 AM    Creatinine 1.17 07/26/2022 03:15 PM    BUN 17 07/26/2022 03:15 PM    Sodium 141 07/26/2022 03:15 PM    Potassium 4.4 07/26/2022 03:15 PM    Chloride 102 07/26/2022 03:15 PM    CO2 25 07/26/2022 03:15 PM      Lab Results   Component Value Date/Time    TSH 1.060 01/23/2017 09:57 AM        Assessment/Plan:     1. Type 2 Diabetes Mellitus with neuropathy,retinopathy   Lab Results   Component Value Date/Time    Hemoglobin A1c 7.7 (H) 07/26/2022 03:15 PM    Hemoglobin A1c (POC) 7.5 06/18/2020 11:43 AM     Stable  Could not tolerate metformin  Trulicity 4.5 mg,Tresiba    Continuous glucose monitor data downloaded for 2 weeks and reviewed with the patient  3 AM hyperglycemia,post dinner hyperglycemia persists, no hypoglycemia    If no improvement next visit SGLT2 inhibitors versus repaglinide      Check blood glucose 4 times daily  FLU annually ,Pneumovax ,aspirin daily,annual eye exam,microalbumin    2. HTN : Continue current therapy    3. Hyperlipidemia : Statin    4. Obesity:Body mass index is 40.48 kg/m².    Continue low-carb diet    5  Hypogonadism /ED -  Shift worker     Toe amputation right 2 nd toe     There are no Patient Instructions on file for this visit. Thank you for allowing me to participate in the care of this patient. Myles Ramirez MD      Patient verbalized understanding    Voice-recognition software was used to generate this report, which may result in some phonetic-based errors in the grammar and contents. Even though attempts were made to correct all the mistakes, some may have been missed and remained in the body of the report.

## 2022-11-17 DIAGNOSIS — Z79.4 UNCONTROLLED TYPE 2 DIABETES MELLITUS WITH HYPERGLYCEMIA, WITH LONG-TERM CURRENT USE OF INSULIN (HCC): ICD-10-CM

## 2022-11-17 DIAGNOSIS — E78.5 HYPERLIPIDEMIA, UNSPECIFIED HYPERLIPIDEMIA TYPE: ICD-10-CM

## 2022-11-17 DIAGNOSIS — E11.65 UNCONTROLLED TYPE 2 DIABETES MELLITUS WITH HYPERGLYCEMIA, WITH LONG-TERM CURRENT USE OF INSULIN (HCC): ICD-10-CM

## 2022-11-17 DIAGNOSIS — I10 ESSENTIAL HYPERTENSION: ICD-10-CM

## 2022-11-17 RX ORDER — LISINOPRIL AND HYDROCHLOROTHIAZIDE 20; 25 MG/1; MG/1
1 TABLET ORAL DAILY
Qty: 90 TABLET | Refills: 3 | Status: SHIPPED | OUTPATIENT
Start: 2022-11-17

## 2023-03-13 DIAGNOSIS — E11.65 TYPE 2 DIABETES MELLITUS WITH HYPERGLYCEMIA, WITH LONG-TERM CURRENT USE OF INSULIN (HCC): ICD-10-CM

## 2023-03-13 DIAGNOSIS — Z79.4 TYPE 2 DIABETES MELLITUS WITH HYPERGLYCEMIA, WITH LONG-TERM CURRENT USE OF INSULIN (HCC): ICD-10-CM

## 2023-03-13 RX ORDER — DULAGLUTIDE 4.5 MG/.5ML
4.5 INJECTION, SOLUTION SUBCUTANEOUS
Qty: 12 EACH | Refills: 3 | Status: SHIPPED | OUTPATIENT
Start: 2023-03-13

## 2023-03-14 DIAGNOSIS — E11.65 TYPE 2 DIABETES MELLITUS WITH HYPERGLYCEMIA, WITH LONG-TERM CURRENT USE OF INSULIN (HCC): ICD-10-CM

## 2023-03-14 DIAGNOSIS — Z79.4 TYPE 2 DIABETES MELLITUS WITH HYPERGLYCEMIA, WITH LONG-TERM CURRENT USE OF INSULIN (HCC): ICD-10-CM

## 2023-03-14 RX ORDER — INSULIN DEGLUDEC INJECTION 200 U/ML
INJECTION, SOLUTION SUBCUTANEOUS
Qty: 36 ML | Refills: 3 | Status: SHIPPED | OUTPATIENT
Start: 2023-03-14

## 2023-05-20 RX ORDER — ATORVASTATIN CALCIUM 10 MG/1
1 TABLET, FILM COATED ORAL NIGHTLY
COMMUNITY
Start: 2022-08-03

## 2023-05-20 RX ORDER — DULAGLUTIDE 4.5 MG/.5ML
4.5 INJECTION, SOLUTION SUBCUTANEOUS
COMMUNITY
Start: 2023-03-13

## 2023-05-20 RX ORDER — DICLOFENAC SODIUM 75 MG/1
75 TABLET, DELAYED RELEASE ORAL 2 TIMES DAILY
COMMUNITY
Start: 2021-11-30

## 2023-05-20 RX ORDER — LANCETS 30 GAUGE
EACH MISCELLANEOUS
COMMUNITY
Start: 2019-11-07

## 2023-05-20 RX ORDER — INSULIN DEGLUDEC 200 U/ML
INJECTION, SOLUTION SUBCUTANEOUS
COMMUNITY
Start: 2023-03-14

## 2023-05-20 RX ORDER — LISINOPRIL AND HYDROCHLOROTHIAZIDE 25; 20 MG/1; MG/1
1 TABLET ORAL DAILY
COMMUNITY
Start: 2022-11-17

## 2023-08-03 DIAGNOSIS — E11.65 TYPE 2 DIABETES MELLITUS WITH HYPERGLYCEMIA, WITH LONG-TERM CURRENT USE OF INSULIN (HCC): Primary | ICD-10-CM

## 2023-08-03 DIAGNOSIS — Z79.4 TYPE 2 DIABETES MELLITUS WITH HYPERGLYCEMIA, WITH LONG-TERM CURRENT USE OF INSULIN (HCC): Primary | ICD-10-CM

## 2023-09-01 ENCOUNTER — OFFICE VISIT (OUTPATIENT)
Age: 53
End: 2023-09-01

## 2023-09-01 VITALS
HEIGHT: 75 IN | TEMPERATURE: 98.2 F | OXYGEN SATURATION: 97 % | HEART RATE: 71 BPM | WEIGHT: 315 LBS | DIASTOLIC BLOOD PRESSURE: 71 MMHG | RESPIRATION RATE: 20 BRPM | SYSTOLIC BLOOD PRESSURE: 126 MMHG | BODY MASS INDEX: 39.17 KG/M2

## 2023-09-01 DIAGNOSIS — E11.65 TYPE 2 DIABETES MELLITUS WITH HYPERGLYCEMIA, WITH LONG-TERM CURRENT USE OF INSULIN (HCC): Primary | ICD-10-CM

## 2023-09-01 DIAGNOSIS — I10 ESSENTIAL (PRIMARY) HYPERTENSION: ICD-10-CM

## 2023-09-01 DIAGNOSIS — Z79.4 TYPE 2 DIABETES MELLITUS WITH HYPERGLYCEMIA, WITH LONG-TERM CURRENT USE OF INSULIN (HCC): Primary | ICD-10-CM

## 2023-09-01 DIAGNOSIS — Z79.4 LONG TERM (CURRENT) USE OF INSULIN (HCC): ICD-10-CM

## 2023-09-01 LAB — HBA1C MFR BLD: 7.6 %

## 2023-09-02 LAB
ANION GAP SERPL CALC-SCNC: 3 MMOL/L (ref 5–15)
BUN SERPL-MCNC: 26 MG/DL (ref 6–20)
BUN/CREAT SERPL: 19 (ref 12–20)
CALCIUM SERPL-MCNC: 9.4 MG/DL (ref 8.5–10.1)
CHLORIDE SERPL-SCNC: 108 MMOL/L (ref 97–108)
CO2 SERPL-SCNC: 29 MMOL/L (ref 21–32)
CREAT SERPL-MCNC: 1.38 MG/DL (ref 0.7–1.3)
CREAT UR-MCNC: 138 MG/DL
GLUCOSE SERPL-MCNC: 92 MG/DL (ref 65–100)
MICROALBUMIN UR-MCNC: 2.17 MG/DL
MICROALBUMIN/CREAT UR-RTO: 16 MG/G (ref 0–30)
POTASSIUM SERPL-SCNC: 4.6 MMOL/L (ref 3.5–5.1)
SODIUM SERPL-SCNC: 140 MMOL/L (ref 136–145)

## 2023-11-13 DIAGNOSIS — E11.65 TYPE 2 DIABETES MELLITUS WITH HYPERGLYCEMIA, WITH LONG-TERM CURRENT USE OF INSULIN (HCC): Primary | ICD-10-CM

## 2023-11-13 DIAGNOSIS — Z79.4 LONG TERM (CURRENT) USE OF INSULIN (HCC): ICD-10-CM

## 2023-11-13 DIAGNOSIS — Z79.4 TYPE 2 DIABETES MELLITUS WITH HYPERGLYCEMIA, WITH LONG-TERM CURRENT USE OF INSULIN (HCC): Primary | ICD-10-CM

## 2023-11-13 RX ORDER — LISINOPRIL AND HYDROCHLOROTHIAZIDE 25; 20 MG/1; MG/1
1 TABLET ORAL DAILY
Qty: 90 TABLET | Refills: 3 | Status: SHIPPED | OUTPATIENT
Start: 2023-11-13

## 2023-11-30 LAB
HBA1C MFR BLD HPLC: 7.3 %
PSA, EXTERNAL: 0.3

## 2023-12-04 ENCOUNTER — OFFICE VISIT (OUTPATIENT)
Age: 53
End: 2023-12-04
Payer: COMMERCIAL

## 2023-12-04 VITALS
SYSTOLIC BLOOD PRESSURE: 141 MMHG | TEMPERATURE: 98.1 F | DIASTOLIC BLOOD PRESSURE: 71 MMHG | HEART RATE: 79 BPM | BODY MASS INDEX: 41.37 KG/M2 | HEIGHT: 75 IN | RESPIRATION RATE: 20 BRPM | OXYGEN SATURATION: 97 %

## 2023-12-04 DIAGNOSIS — I10 ESSENTIAL HYPERTENSION: ICD-10-CM

## 2023-12-04 DIAGNOSIS — E11.65 TYPE 2 DIABETES MELLITUS WITH HYPERGLYCEMIA, UNSPECIFIED WHETHER LONG TERM INSULIN USE (HCC): Primary | ICD-10-CM

## 2023-12-04 DIAGNOSIS — E78.2 MIXED HYPERLIPIDEMIA: ICD-10-CM

## 2023-12-04 PROCEDURE — 3077F SYST BP >= 140 MM HG: CPT | Performed by: INTERNAL MEDICINE

## 2023-12-04 PROCEDURE — 3078F DIAST BP <80 MM HG: CPT | Performed by: INTERNAL MEDICINE

## 2023-12-04 PROCEDURE — 95251 CONT GLUC MNTR ANALYSIS I&R: CPT | Performed by: INTERNAL MEDICINE

## 2023-12-04 PROCEDURE — 99214 OFFICE O/P EST MOD 30 MIN: CPT | Performed by: INTERNAL MEDICINE

## 2023-12-04 RX ORDER — BLOOD-GLUCOSE SENSOR
EACH MISCELLANEOUS
Qty: 6 EACH | Refills: 3 | Status: SHIPPED | OUTPATIENT
Start: 2023-12-04 | End: 2023-12-05 | Stop reason: SDUPTHER

## 2023-12-05 ENCOUNTER — TELEPHONE (OUTPATIENT)
Age: 53
End: 2023-12-05

## 2023-12-05 DIAGNOSIS — E11.65 TYPE 2 DIABETES MELLITUS WITH HYPERGLYCEMIA, UNSPECIFIED WHETHER LONG TERM INSULIN USE (HCC): ICD-10-CM

## 2023-12-05 RX ORDER — BLOOD-GLUCOSE SENSOR
EACH MISCELLANEOUS
Qty: 6 EACH | Refills: 3 | Status: SHIPPED | OUTPATIENT
Start: 2023-12-05

## 2023-12-05 NOTE — TELEPHONE ENCOUNTER
Delta 3 system sent to [facility] they have closed because sent yesterday it did not go through send to [facility]

## 2024-04-12 ENCOUNTER — OFFICE VISIT (OUTPATIENT)
Age: 54
End: 2024-04-12
Payer: COMMERCIAL

## 2024-04-12 VITALS
DIASTOLIC BLOOD PRESSURE: 71 MMHG | WEIGHT: 315 LBS | TEMPERATURE: 98 F | HEART RATE: 70 BPM | BODY MASS INDEX: 39.17 KG/M2 | SYSTOLIC BLOOD PRESSURE: 120 MMHG | OXYGEN SATURATION: 98 % | HEIGHT: 75 IN | RESPIRATION RATE: 18 BRPM

## 2024-04-12 DIAGNOSIS — I10 ESSENTIAL HYPERTENSION: ICD-10-CM

## 2024-04-12 DIAGNOSIS — E11.65 TYPE 2 DIABETES MELLITUS WITH HYPERGLYCEMIA, WITH LONG-TERM CURRENT USE OF INSULIN (HCC): Primary | ICD-10-CM

## 2024-04-12 DIAGNOSIS — Z79.4 TYPE 2 DIABETES MELLITUS WITH HYPERGLYCEMIA, WITH LONG-TERM CURRENT USE OF INSULIN (HCC): Primary | ICD-10-CM

## 2024-04-12 DIAGNOSIS — E78.2 MIXED HYPERLIPIDEMIA: ICD-10-CM

## 2024-04-12 LAB — HBA1C MFR BLD: 7.3 %

## 2024-04-12 PROCEDURE — 3074F SYST BP LT 130 MM HG: CPT | Performed by: INTERNAL MEDICINE

## 2024-04-12 PROCEDURE — 83036 HEMOGLOBIN GLYCOSYLATED A1C: CPT | Performed by: INTERNAL MEDICINE

## 2024-04-12 PROCEDURE — 95251 CONT GLUC MNTR ANALYSIS I&R: CPT | Performed by: INTERNAL MEDICINE

## 2024-04-12 PROCEDURE — 99214 OFFICE O/P EST MOD 30 MIN: CPT | Performed by: INTERNAL MEDICINE

## 2024-04-12 PROCEDURE — 3078F DIAST BP <80 MM HG: CPT | Performed by: INTERNAL MEDICINE

## 2024-04-12 RX ORDER — EMPAGLIFLOZIN 10 MG/1
10 TABLET, FILM COATED ORAL DAILY
COMMUNITY
Start: 2024-03-27

## 2024-04-12 RX ORDER — ATORVASTATIN CALCIUM 10 MG/1
10 TABLET, FILM COATED ORAL NIGHTLY
Qty: 90 TABLET | Refills: 3 | Status: SHIPPED | OUTPATIENT
Start: 2024-04-12

## 2024-04-12 NOTE — PROGRESS NOTES
[facility] DIABETES AND ENDOCRINOLOGY                 Nay Burgess MD               Patient Information   Date:8/3/2022   Name : Clarke Boyle 51 y.o.       YOB: 1970           Referred by: None               Chief Complaint   Patient presents with    Diabetes               History of Present Illness: Clarke Boyle is here for follow-up of type 2 diabetes which was diagnosed 20 years ago     Type 2 DM on insulin  He is on Tresiba, on Trulicity consistently  Increased urination, recently Jardiance was started by Dr. 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  Weight stable  Eye exam within last 1 year   Checking blood glucose 3-4 times daily   No chest pain      Day shift - 7 A - 7 PM - 8 AM breakfast , 12 noon lunch , dinner 6 pm   Night shifts -   2 PM - 6 pm - 12 AM              Wt Readings from Last 3 Encounters:   04/12/24 (!) 149.7 kg (330 lb)   09/01/23 (!) 150.1 kg (331 lb)   08/03/22 (!) 146.9 kg (323 lb 14.4 oz)        Past Medical History:   Diagnosis Date    Diabetes insipidus (HCC)     HTN (hypertension)     Hyperlipidemia     Type II or unspecified type diabetes mellitus without mention of complication, uncontrolled        Current Outpatient Medications   Medication Sig    JARDIANCE 10 MG tablet Take 1 tablet by mouth daily    atorvastatin (LIPITOR) 10 MG tablet Take 1 tablet by mouth nightly    Continuous Blood Gluc Sensor (FREESTYLE KAE 3 SENSOR) MISC Use as directed every 14 days. Dx code: E11.65    lisinopril-hydroCHLOROthiazide (PRINZIDE;ZESTORETIC) 20-25 MG per tablet take 1 tablet by mouth daily    Lancets MISC Use to test blood glucose 3 times daily. Dx code E11.65    diclofenac (VOLTAREN) 75 MG EC tablet Take 1 tablet by mouth as needed    Dulaglutide (TRULICITY) 4.5 MG/0.5ML SOPN Inject 4.5 mg into the skin every 7 days    Insulin Degludec (TRESIBA FLEXTOUCH) 200 UNIT/ML SOPN inject 80

## 2024-06-10 DIAGNOSIS — Z79.4 TYPE 2 DIABETES MELLITUS WITH HYPERGLYCEMIA, WITH LONG-TERM CURRENT USE OF INSULIN (HCC): Primary | ICD-10-CM

## 2024-06-10 DIAGNOSIS — E11.65 TYPE 2 DIABETES MELLITUS WITH HYPERGLYCEMIA, WITH LONG-TERM CURRENT USE OF INSULIN (HCC): Primary | ICD-10-CM

## 2024-06-10 RX ORDER — DULAGLUTIDE 4.5 MG/.5ML
4.5 INJECTION, SOLUTION SUBCUTANEOUS
Qty: 6 ML | Refills: 3 | Status: SHIPPED | OUTPATIENT
Start: 2024-06-10

## 2024-08-02 ENCOUNTER — LAB (OUTPATIENT)
Age: 54
End: 2024-08-02

## 2024-08-02 DIAGNOSIS — E11.65 TYPE 2 DIABETES MELLITUS WITH HYPERGLYCEMIA, WITH LONG-TERM CURRENT USE OF INSULIN (HCC): ICD-10-CM

## 2024-08-02 DIAGNOSIS — E78.2 MIXED HYPERLIPIDEMIA: ICD-10-CM

## 2024-08-02 DIAGNOSIS — Z79.4 TYPE 2 DIABETES MELLITUS WITH HYPERGLYCEMIA, WITH LONG-TERM CURRENT USE OF INSULIN (HCC): ICD-10-CM

## 2024-08-02 DIAGNOSIS — I10 ESSENTIAL HYPERTENSION: ICD-10-CM

## 2024-08-02 LAB
ANION GAP SERPL CALC-SCNC: 4 MMOL/L (ref 5–15)
BUN SERPL-MCNC: 10 MG/DL (ref 6–20)
BUN/CREAT SERPL: 7 (ref 12–20)
CALCIUM SERPL-MCNC: 8.9 MG/DL (ref 8.5–10.1)
CHLORIDE SERPL-SCNC: 107 MMOL/L (ref 97–108)
CHOLEST SERPL-MCNC: 152 MG/DL
CO2 SERPL-SCNC: 29 MMOL/L (ref 21–32)
CREAT SERPL-MCNC: 1.37 MG/DL (ref 0.7–1.3)
CREAT UR-MCNC: 319 MG/DL
EST. AVERAGE GLUCOSE BLD GHB EST-MCNC: 160 MG/DL
GLUCOSE SERPL-MCNC: 106 MG/DL (ref 65–100)
HBA1C MFR BLD: 7.2 % (ref 4–5.6)
HDLC SERPL-MCNC: 29 MG/DL
HDLC SERPL: 5.2 (ref 0–5)
LDLC SERPL CALC-MCNC: 104.6 MG/DL (ref 0–100)
MICROALBUMIN UR-MCNC: 3.58 MG/DL
MICROALBUMIN/CREAT UR-RTO: 11 MG/G (ref 0–30)
POTASSIUM SERPL-SCNC: 4.3 MMOL/L (ref 3.5–5.1)
SODIUM SERPL-SCNC: 140 MMOL/L (ref 136–145)
TRIGL SERPL-MCNC: 92 MG/DL
VLDLC SERPL CALC-MCNC: 18.4 MG/DL

## 2024-08-06 ENCOUNTER — OFFICE VISIT (OUTPATIENT)
Age: 54
End: 2024-08-06
Payer: COMMERCIAL

## 2024-08-06 VITALS
HEART RATE: 75 BPM | BODY MASS INDEX: 39.17 KG/M2 | DIASTOLIC BLOOD PRESSURE: 71 MMHG | HEIGHT: 75 IN | TEMPERATURE: 98.7 F | WEIGHT: 315 LBS | SYSTOLIC BLOOD PRESSURE: 132 MMHG | OXYGEN SATURATION: 97 %

## 2024-08-06 DIAGNOSIS — Z79.4 TYPE 2 DIABETES MELLITUS WITH HYPERGLYCEMIA, WITH LONG-TERM CURRENT USE OF INSULIN (HCC): Primary | ICD-10-CM

## 2024-08-06 DIAGNOSIS — E11.65 TYPE 2 DIABETES MELLITUS WITH HYPERGLYCEMIA, WITH LONG-TERM CURRENT USE OF INSULIN (HCC): Primary | ICD-10-CM

## 2024-08-06 DIAGNOSIS — I10 ESSENTIAL HYPERTENSION: ICD-10-CM

## 2024-08-06 DIAGNOSIS — E78.2 MIXED HYPERLIPIDEMIA: ICD-10-CM

## 2024-08-06 PROCEDURE — 95251 CONT GLUC MNTR ANALYSIS I&R: CPT | Performed by: INTERNAL MEDICINE

## 2024-08-06 PROCEDURE — 3078F DIAST BP <80 MM HG: CPT | Performed by: INTERNAL MEDICINE

## 2024-08-06 PROCEDURE — 3051F HG A1C>EQUAL 7.0%<8.0%: CPT | Performed by: INTERNAL MEDICINE

## 2024-08-06 PROCEDURE — 3075F SYST BP GE 130 - 139MM HG: CPT | Performed by: INTERNAL MEDICINE

## 2024-08-06 PROCEDURE — 99215 OFFICE O/P EST HI 40 MIN: CPT | Performed by: INTERNAL MEDICINE

## 2024-08-06 RX ORDER — EMPAGLIFLOZIN 10 MG/1
10 TABLET, FILM COATED ORAL DAILY
Qty: 90 TABLET | Refills: 3 | Status: SHIPPED | OUTPATIENT
Start: 2024-08-06

## 2024-08-06 NOTE — PROGRESS NOTES
[facility] DIABETES AND ENDOCRINOLOGY                 Nay Burgess MD               Referred by: None               Chief Complaint   Patient presents with    Diabetes               History of Present Illness: Clarke Boyle is here for follow-up of type 2 diabetes which was diagnosed 20 years ago     Type 2 DM on insulin  He is on Tresiba, on Trulicity consistently  Jardiance was started by Dr. 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  Weight stable    Eye exam within last 1 year   Checking blood glucose 3-4 times daily   No chest pain      Day shift - 7 A - 7 PM - 8 AM breakfast , 12 noon lunch , dinner 6 pm   Night shifts -   2 PM - 6 pm - 12 AM              Wt Readings from Last 3 Encounters:   08/06/24 (!) 157 kg (346 lb 3.2 oz)   04/12/24 (!) 149.7 kg (330 lb)   09/01/23 (!) 150.1 kg (331 lb)        Past Medical History:   Diagnosis Date    Diabetes insipidus (HCC)     HTN (hypertension)     Hyperlipidemia     Type II or unspecified type diabetes mellitus without mention of complication, uncontrolled        Current Outpatient Medications   Medication Sig    Dulaglutide (TRULICITY) 4.5 MG/0.5ML SOPN Inject 4.5 mg into the skin every 7 days    JARDIANCE 10 MG tablet Take 1 tablet by mouth daily    atorvastatin (LIPITOR) 10 MG tablet Take 1 tablet by mouth nightly (Patient taking differently: Take 1 tablet by mouth daily)    Continuous Blood Gluc Sensor (FREESTYLE KAE 3 SENSOR) MISC Use as directed every 14 days. Dx code: E11.65    lisinopril-hydroCHLOROthiazide (PRINZIDE;ZESTORETIC) 20-25 MG per tablet take 1 tablet by mouth daily    Lancets MISC Use to test blood glucose 3 times daily. Dx code E11.65    diclofenac (VOLTAREN) 75 MG EC tablet Take 1 tablet by mouth as needed    Insulin Degludec (TRESIBA FLEXTOUCH) 200 UNIT/ML SOPN inject 80 units subcutaneously AT NIGHT    CHROMIUM PICOLINATE PO Take 1 tablet by mouth daily (Patient not taking: Reported on

## 2024-11-22 DIAGNOSIS — Z79.4 TYPE 2 DIABETES MELLITUS WITH HYPERGLYCEMIA, WITH LONG-TERM CURRENT USE OF INSULIN (HCC): ICD-10-CM

## 2024-11-22 DIAGNOSIS — Z79.4 LONG TERM (CURRENT) USE OF INSULIN (HCC): ICD-10-CM

## 2024-11-22 DIAGNOSIS — E11.65 TYPE 2 DIABETES MELLITUS WITH HYPERGLYCEMIA, WITH LONG-TERM CURRENT USE OF INSULIN (HCC): ICD-10-CM

## 2024-11-22 RX ORDER — LISINOPRIL AND HYDROCHLOROTHIAZIDE 20; 25 MG/1; MG/1
1 TABLET ORAL DAILY
Qty: 90 TABLET | Refills: 3 | Status: SHIPPED | OUTPATIENT
Start: 2024-11-22

## 2024-11-26 DIAGNOSIS — E11.65 TYPE 2 DIABETES MELLITUS WITH HYPERGLYCEMIA, WITH LONG-TERM CURRENT USE OF INSULIN (HCC): Primary | ICD-10-CM

## 2024-11-26 DIAGNOSIS — Z79.4 TYPE 2 DIABETES MELLITUS WITH HYPERGLYCEMIA, WITH LONG-TERM CURRENT USE OF INSULIN (HCC): Primary | ICD-10-CM

## 2024-11-26 RX ORDER — INSULIN DEGLUDEC 200 U/ML
INJECTION, SOLUTION SUBCUTANEOUS
Qty: 45 ML | Refills: 3 | Status: SHIPPED | OUTPATIENT
Start: 2024-11-26

## 2025-01-31 ENCOUNTER — LAB (OUTPATIENT)
Age: 55
End: 2025-01-31

## 2025-01-31 DIAGNOSIS — Z79.4 TYPE 2 DIABETES MELLITUS WITH HYPERGLYCEMIA, WITH LONG-TERM CURRENT USE OF INSULIN (HCC): ICD-10-CM

## 2025-01-31 DIAGNOSIS — E11.65 TYPE 2 DIABETES MELLITUS WITH HYPERGLYCEMIA, WITH LONG-TERM CURRENT USE OF INSULIN (HCC): ICD-10-CM

## 2025-01-31 LAB
EST. AVERAGE GLUCOSE BLD GHB EST-MCNC: 166 MG/DL
HBA1C MFR BLD: 7.4 % (ref 4–5.6)

## 2025-02-07 ENCOUNTER — OFFICE VISIT (OUTPATIENT)
Age: 55
End: 2025-02-07
Payer: COMMERCIAL

## 2025-02-07 VITALS
HEIGHT: 75 IN | HEART RATE: 85 BPM | SYSTOLIC BLOOD PRESSURE: 139 MMHG | BODY MASS INDEX: 38.52 KG/M2 | WEIGHT: 309.8 LBS | OXYGEN SATURATION: 98 % | DIASTOLIC BLOOD PRESSURE: 82 MMHG | TEMPERATURE: 98 F

## 2025-02-07 DIAGNOSIS — E78.2 MIXED HYPERLIPIDEMIA: ICD-10-CM

## 2025-02-07 DIAGNOSIS — E11.65 TYPE 2 DIABETES MELLITUS WITH HYPERGLYCEMIA, WITH LONG-TERM CURRENT USE OF INSULIN (HCC): Primary | ICD-10-CM

## 2025-02-07 DIAGNOSIS — E23.0 HYPOGONADOTROPIC HYPOGONADISM IN MALE (HCC): ICD-10-CM

## 2025-02-07 DIAGNOSIS — Z79.4 TYPE 2 DIABETES MELLITUS WITH HYPERGLYCEMIA, WITH LONG-TERM CURRENT USE OF INSULIN (HCC): Primary | ICD-10-CM

## 2025-02-07 DIAGNOSIS — I10 ESSENTIAL HYPERTENSION: ICD-10-CM

## 2025-02-07 PROCEDURE — 99214 OFFICE O/P EST MOD 30 MIN: CPT | Performed by: INTERNAL MEDICINE

## 2025-02-07 PROCEDURE — 3075F SYST BP GE 130 - 139MM HG: CPT | Performed by: INTERNAL MEDICINE

## 2025-02-07 PROCEDURE — 3051F HG A1C>EQUAL 7.0%<8.0%: CPT | Performed by: INTERNAL MEDICINE

## 2025-02-07 PROCEDURE — 95251 CONT GLUC MNTR ANALYSIS I&R: CPT | Performed by: INTERNAL MEDICINE

## 2025-02-07 PROCEDURE — 3079F DIAST BP 80-89 MM HG: CPT | Performed by: INTERNAL MEDICINE

## 2025-02-07 NOTE — PROGRESS NOTES
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  Weight stable    Eye exam within last 1 year   Checking blood glucose 3-4 times daily   No chest pain      Day shift - 7 A - 7 PM - 8 AM breakfast , 12 noon lunch , dinner 6 pm   Night shifts -   2 PM - 6 pm - 12 AM              Wt Readings from Last 3 Encounters:   08/06/24 (!) 157 kg (346 lb 3.2 oz)   04/12/24 (!) 149.7 kg (330 lb)   09/01/23 (!) 150.1 kg (331 lb)        Past Medical History:   Diagnosis Date    Diabetes insipidus (HCC)     HTN (hypertension)     Hyperlipidemia     Type II or unspecified type diabetes mellitus without mention of complication, uncontrolled        Current Outpatient Medications   Medication Sig    TRESIBA FLEXTOUCH 200 UNIT/ML SOPN INJECT 80 UNITS SUBCUTANEOUSLY AT NIGHT    lisinopril-hydroCHLOROthiazide (PRINZIDE;ZESTORETIC) 20-25 MG per tablet TAKE 1 TABLET BY MOUTH DAILY    JARDIANCE 10 MG tablet Take 1 tablet by mouth daily    Dulaglutide (TRULICITY) 4.5 MG/0.5ML SOPN Inject 4.5 mg into the skin every 7 days    atorvastatin (LIPITOR) 10 MG tablet Take 1 tablet by mouth nightly (Patient taking differently: Take 1 tablet by mouth daily)    Continuous Blood Gluc Sensor (FREESTYLE KAE 3 SENSOR) MISC Use as directed every 14 days. Dx code: E11.65    Lancets MISC Use to test blood glucose 3 times daily. Dx code E11.65    CHROMIUM PICOLINATE PO Take 1 tablet by mouth daily (Patient not taking: Reported on 9/1/2023)    diclofenac (VOLTAREN) 75 MG EC tablet Take 1 tablet by mouth as needed (Patient not

## 2025-02-07 NOTE — PROGRESS NOTES
Clarke Boyle is a 54 y.o. male here for   Chief Complaint   Patient presents with    Diabetes    low testorone       1. Have you been to the ER, urgent care clinic since your last visit?  Hospitalized since your last visit? -No    2. Have you seen or consulted any other health care providers outside of the [facility] since your last visit?  Include any pap smears or colon screening.-No

## 2025-03-08 DIAGNOSIS — E11.65 TYPE 2 DIABETES MELLITUS WITH HYPERGLYCEMIA, UNSPECIFIED WHETHER LONG TERM INSULIN USE (HCC): ICD-10-CM

## 2025-03-10 RX ORDER — ACYCLOVIR 800 MG/1
TABLET ORAL
Qty: 6 EACH | Refills: 3 | Status: SHIPPED | OUTPATIENT
Start: 2025-03-10

## 2025-03-16 DIAGNOSIS — E78.2 MIXED HYPERLIPIDEMIA: ICD-10-CM

## 2025-03-16 DIAGNOSIS — E11.65 TYPE 2 DIABETES MELLITUS WITH HYPERGLYCEMIA, WITH LONG-TERM CURRENT USE OF INSULIN (HCC): ICD-10-CM

## 2025-03-16 DIAGNOSIS — Z79.4 TYPE 2 DIABETES MELLITUS WITH HYPERGLYCEMIA, WITH LONG-TERM CURRENT USE OF INSULIN (HCC): ICD-10-CM

## 2025-03-17 RX ORDER — ATORVASTATIN CALCIUM 10 MG/1
10 TABLET, FILM COATED ORAL NIGHTLY
Qty: 90 TABLET | Refills: 3 | Status: SHIPPED | OUTPATIENT
Start: 2025-03-17

## 2025-05-12 PROBLEM — N20.1 URETERIC STONE: Status: ACTIVE | Noted: 2018-12-09

## 2025-05-23 ENCOUNTER — LAB (OUTPATIENT)
Age: 55
End: 2025-05-23

## 2025-05-23 DIAGNOSIS — I10 ESSENTIAL HYPERTENSION: ICD-10-CM

## 2025-05-23 DIAGNOSIS — E11.65 TYPE 2 DIABETES MELLITUS WITH HYPERGLYCEMIA, WITH LONG-TERM CURRENT USE OF INSULIN (HCC): ICD-10-CM

## 2025-05-23 DIAGNOSIS — Z79.4 TYPE 2 DIABETES MELLITUS WITH HYPERGLYCEMIA, WITH LONG-TERM CURRENT USE OF INSULIN (HCC): ICD-10-CM

## 2025-05-23 DIAGNOSIS — E78.2 MIXED HYPERLIPIDEMIA: ICD-10-CM

## 2025-05-24 LAB
ANION GAP SERPL CALC-SCNC: 6 MMOL/L (ref 2–12)
BUN SERPL-MCNC: 26 MG/DL (ref 6–20)
BUN/CREAT SERPL: 13 (ref 12–20)
CALCIUM SERPL-MCNC: 9.8 MG/DL (ref 8.5–10.1)
CHLORIDE SERPL-SCNC: 102 MMOL/L (ref 97–108)
CO2 SERPL-SCNC: 30 MMOL/L (ref 21–32)
CREAT SERPL-MCNC: 1.96 MG/DL (ref 0.7–1.3)
CREAT UR-MCNC: 173 MG/DL
EST. AVERAGE GLUCOSE BLD GHB EST-MCNC: 143 MG/DL
GLUCOSE SERPL-MCNC: 93 MG/DL (ref 65–100)
HBA1C MFR BLD: 6.6 % (ref 4–5.6)
LDLC SERPL DIRECT ASSAY-MCNC: 77 MG/DL (ref 0–100)
MICROALBUMIN UR-MCNC: 1.28 MG/DL
MICROALBUMIN/CREAT UR-RTO: 7 MG/G (ref 0–30)
POTASSIUM SERPL-SCNC: 4.8 MMOL/L (ref 3.5–5.1)
SODIUM SERPL-SCNC: 138 MMOL/L (ref 136–145)

## 2025-05-26 ENCOUNTER — RESULTS FOLLOW-UP (OUTPATIENT)
Age: 55
End: 2025-05-26

## 2025-06-03 ENCOUNTER — HOSPITAL ENCOUNTER (OUTPATIENT)
Facility: HOSPITAL | Age: 55
Discharge: HOME | End: 2025-06-03
Attending: PHYSICIAN ASSISTANT
Payer: COMMERCIAL

## 2025-06-03 VITALS
HEIGHT: 75 IN | HEART RATE: 75 BPM | WEIGHT: 315 LBS | SYSTOLIC BLOOD PRESSURE: 139 MMHG | RESPIRATION RATE: 18 BRPM | DIASTOLIC BLOOD PRESSURE: 78 MMHG | BODY MASS INDEX: 39.17 KG/M2 | TEMPERATURE: 98.1 F

## 2025-06-03 DIAGNOSIS — L97.423 DIABETIC ULCER OF LEFT MIDFOOT ASSOCIATED WITH TYPE 2 DIABETES MELLITUS, WITH NECROSIS OF MUSCLE (HCC): Primary | ICD-10-CM

## 2025-06-03 DIAGNOSIS — E11.621 DIABETIC ULCER OF LEFT MIDFOOT ASSOCIATED WITH TYPE 2 DIABETES MELLITUS, WITH NECROSIS OF MUSCLE (HCC): Primary | ICD-10-CM

## 2025-06-03 PROCEDURE — 99203 OFFICE O/P NEW LOW 30 MIN: CPT

## 2025-06-03 PROCEDURE — 11043 DBRDMT MUSC&/FSCA 1ST 20/<: CPT

## 2025-06-03 RX ORDER — NEOMYCIN/BACITRACIN/POLYMYXINB 3.5-400-5K
OINTMENT (GRAM) TOPICAL PRN
Status: CANCELLED | OUTPATIENT
Start: 2025-06-03

## 2025-06-03 RX ORDER — LIDOCAINE 50 MG/G
OINTMENT TOPICAL PRN
OUTPATIENT
Start: 2025-06-03

## 2025-06-03 RX ORDER — LIDOCAINE HYDROCHLORIDE 20 MG/ML
JELLY TOPICAL PRN
Status: CANCELLED | OUTPATIENT
Start: 2025-06-03

## 2025-06-03 RX ORDER — MUPIROCIN 20 MG/G
OINTMENT TOPICAL PRN
Status: CANCELLED | OUTPATIENT
Start: 2025-06-03

## 2025-06-03 RX ORDER — GINSENG 100 MG
CAPSULE ORAL PRN
Status: CANCELLED | OUTPATIENT
Start: 2025-06-03

## 2025-06-03 RX ORDER — CLOBETASOL PROPIONATE 0.5 MG/G
OINTMENT TOPICAL PRN
OUTPATIENT
Start: 2025-06-03

## 2025-06-03 RX ORDER — BACITRACIN ZINC AND POLYMYXIN B SULFATE 500; 1000 [USP'U]/G; [USP'U]/G
OINTMENT TOPICAL PRN
Status: CANCELLED | OUTPATIENT
Start: 2025-06-03

## 2025-06-03 RX ORDER — BETAMETHASONE DIPROPIONATE 0.5 MG/G
CREAM TOPICAL PRN
Status: CANCELLED | OUTPATIENT
Start: 2025-06-03

## 2025-06-03 RX ORDER — SODIUM CHLOR/HYPOCHLOROUS ACID 0.033 %
SOLUTION, IRRIGATION IRRIGATION PRN
OUTPATIENT
Start: 2025-06-03

## 2025-06-03 RX ORDER — LIDOCAINE HYDROCHLORIDE 20 MG/ML
JELLY TOPICAL PRN
OUTPATIENT
Start: 2025-06-03

## 2025-06-03 RX ORDER — SODIUM CHLOR/HYPOCHLOROUS ACID 0.033 %
SOLUTION, IRRIGATION IRRIGATION PRN
Status: CANCELLED | OUTPATIENT
Start: 2025-06-03

## 2025-06-03 RX ORDER — CLOBETASOL PROPIONATE 0.5 MG/G
OINTMENT TOPICAL PRN
Status: CANCELLED | OUTPATIENT
Start: 2025-06-03

## 2025-06-03 RX ORDER — LIDOCAINE HYDROCHLORIDE 40 MG/ML
SOLUTION TOPICAL PRN
OUTPATIENT
Start: 2025-06-03

## 2025-06-03 RX ORDER — SILVER SULFADIAZINE 10 MG/G
CREAM TOPICAL PRN
Status: CANCELLED | OUTPATIENT
Start: 2025-06-03

## 2025-06-03 RX ORDER — SILVER SULFADIAZINE 10 MG/G
CREAM TOPICAL PRN
OUTPATIENT
Start: 2025-06-03

## 2025-06-03 RX ORDER — TRIAMCINOLONE ACETONIDE 1 MG/G
OINTMENT TOPICAL PRN
Status: CANCELLED | OUTPATIENT
Start: 2025-06-03

## 2025-06-03 RX ORDER — BACITRACIN ZINC AND POLYMYXIN B SULFATE 500; 1000 [USP'U]/G; [USP'U]/G
OINTMENT TOPICAL PRN
OUTPATIENT
Start: 2025-06-03

## 2025-06-03 RX ORDER — BETAMETHASONE DIPROPIONATE 0.5 MG/G
CREAM TOPICAL PRN
OUTPATIENT
Start: 2025-06-03

## 2025-06-03 RX ORDER — GENTAMICIN SULFATE 1 MG/G
OINTMENT TOPICAL PRN
Status: CANCELLED | OUTPATIENT
Start: 2025-06-03

## 2025-06-03 RX ORDER — GENTAMICIN SULFATE 1 MG/G
OINTMENT TOPICAL PRN
OUTPATIENT
Start: 2025-06-03

## 2025-06-03 RX ORDER — TRIAMCINOLONE ACETONIDE 1 MG/G
OINTMENT TOPICAL PRN
OUTPATIENT
Start: 2025-06-03

## 2025-06-03 RX ORDER — LIDOCAINE 40 MG/G
CREAM TOPICAL PRN
OUTPATIENT
Start: 2025-06-03

## 2025-06-03 RX ORDER — NEOMYCIN/BACITRACIN/POLYMYXINB 3.5-400-5K
OINTMENT (GRAM) TOPICAL PRN
OUTPATIENT
Start: 2025-06-03

## 2025-06-03 RX ORDER — LIDOCAINE 50 MG/G
OINTMENT TOPICAL PRN
Status: CANCELLED | OUTPATIENT
Start: 2025-06-03

## 2025-06-03 RX ORDER — LIDOCAINE 40 MG/G
CREAM TOPICAL PRN
Status: CANCELLED | OUTPATIENT
Start: 2025-06-03

## 2025-06-03 RX ORDER — LIDOCAINE HYDROCHLORIDE 40 MG/ML
SOLUTION TOPICAL PRN
Status: CANCELLED | OUTPATIENT
Start: 2025-06-03

## 2025-06-03 RX ORDER — MUPIROCIN 20 MG/G
OINTMENT TOPICAL PRN
OUTPATIENT
Start: 2025-06-03

## 2025-06-03 RX ORDER — GINSENG 100 MG
CAPSULE ORAL PRN
OUTPATIENT
Start: 2025-06-03

## 2025-06-03 ASSESSMENT — PAIN DESCRIPTION - LOCATION: LOCATION: FOOT

## 2025-06-03 ASSESSMENT — PAIN DESCRIPTION - DESCRIPTORS: DESCRIPTORS: ACHING

## 2025-06-03 ASSESSMENT — PAIN SCALES - GENERAL: PAINLEVEL_OUTOF10: 3

## 2025-06-03 ASSESSMENT — PAIN DESCRIPTION - ORIENTATION: ORIENTATION: LEFT

## 2025-06-03 NOTE — WOUND CARE
Called [facility] radiology to request a copy of xray done 5/7. Was informed the results had not been read yet and they would try to have it read today or tomorrow. Was also informed a medical records release form was needed. Patient made aware and signed records release form. Will fax to [facility] 6/4/25. ZANA Leblanc notified.

## 2025-06-03 NOTE — PROGRESS NOTES
[facility]   Wound Care and [facility] Oxygen Therapy Center   Medical Staff Progress Note     Clarke Boyle  MEDICAL RECORD NUMBER:  [id number]  AGE: 54 y.o.   GENDER: male  : 1970  EPISODE DATE:  6/3/2025    Chief complaint and reason for visit:     Chief Complaint   Patient presents with    Wound Check     Left foot      Patient presenting for follow up evaluation of wound(s) per chief complaint.      Subjective and ROS: Symptoms, wound related issues, or other pertinent wound history since last visit: no new wound care issues. Tolerating current treatment. No systemic complaints above baseline.    History of Wound Context: Per original history and physical on this patient. Changes in history since last evaluation: none    Medical Decision Making:     Problem List Items Addressed This Visit          Endocrine    Diabetic ulcer of left midfoot associated with type 2 diabetes mellitus, with necrosis of muscle (HCC) - Primary       Wounds and Treatment Plan:  Developed ulcer on lat left foot.  Went to ER, given bactrim and doxycycline.  Also seen by [facility], who have been debriding.  Now presents w/ deep ulcer w/ epibole.  DM A1C 7.4, sees Endo tomorrow.  Not a smoker.  Has hx amp toe right foot by his DPM.    Wound is deep, w/ large epibole.  Debrided wound, trimmed off epibole.  Dress w/ HF Blue.  RTC 1wk.   Offloading-rx given to Юлия; will ask Endo for ref for Diabetic shoes.  Off work x 2wks. Discussed bathing and protein intake (limited to 40G/d).  RTC weekly until healed.      Other associated diagnoses or problems addressed:  none    Pertinent imaging reviewed including independent interpretation include:  None    Pertinent labs reviewed.   Medical records and review of external note (s) from other providers done as well.    New lab or imaging orders placed:  None     Prescription drug management: N/A     Discussion of management or test interpretation with N/A.    Comorbid

## 2025-06-03 NOTE — DISCHARGE INSTRUCTIONS
Wound Clinic Physician Orders and Discharge Instructions  [facility]  [address], [address]  [place] [address]  Telephone: [phone]     FAX [phone]    NAME:  Clarke Boyle  YOB: 1970  MEDICAL RECORD NUMBER:  [id number]  DATE:  6/3/2025      Return Appointment:    [x] Dressing Supply Provider: Kasia  [] Home Healthcare:  [x] Return Appointment: 1 Week(s)  [] Nurse Visit:     [] Discharge from [facility]:   [] Healed        [] Refer to Provider:         [] Consult    Follow-up Information:    [] Ordered Tests:    [] Vascular/Arterial Testing   [] Please call [phone] to schedule at any [facility] facility      [] Imaging:     [] Please call [phone] to schedule at any [facility] facility. X-rays do not need an appointment, you may walk in to any [facility] facility    [x] Referrals:    [] Infectious Disease  [] Vascular  [] Dermatology  [x] Other: Endocrinology 6/4      [] Rx to pharmacy:   [] Would Culture obtained in clinic  [] OR:    [] Nothing by mouth after midnight the night before the procedure  [] Other:    Wound Cleansing:   Do not scrub or use excessive force.  Cleanse wound prior to applying a clean dressing with:    [x] Normal Saline   [] Mild Soap & Water     [] Keep Wound Dry in Shower  [] Wear a cast cover to shower  [] Other:       Topical Treatments:  Do not apply lotions, creams, or ointments to wound bed unless directed.     [] Apply moisturizing lotion to skin surrounding the wound prior to dressing change.  [] Apply thin film of moisture barrier ointment (Zinc) to skin immediately around wound.  [] Apply Betadine to skin immediately around wound   [] Apply Skin Prep to skin immediately around wound  [] Other:      Dressings:           Wound Location L Foot     [x] Apply Primary Dressing:       [] MediHoney Gel  [] Calcium Alginate with Silver   [] Calcium Alginate without silver   [] Collagen with silver [] Collagen without Silver    [] Santyl

## 2025-06-03 NOTE — WOUND CARE
Wound Care Supplies      Supply Company:     [employer] [address] [place] [address] f: [phone] f: [phone] p: [phone] [email or web address]      Ordering Center:     [facility]  [address] [place] [address], [address] [place] [address]    Phone: [phone]  Fax: [phone]    Patient Information:      Kaylee Pitt  [address] [facility] [id number] [phone]   : 1970  AGE: 54 y.o.     GENDER: male   EPISODE DATE: 6/3/2025    Insurance:      PRIMARY INSURANCE:  Plan: [employer]  Coverage: [place] [employer]BS  Effective Date: 5/10/2023  Group Number: [unfilled]  Subscriber Number: [id number] - ([place] [employer])    Payer/Plan Subscr  Sex Relation Sub. Ins. ID Effective Group Num   1. [place] * KAYLEE PITT 1970 Male Self [id number]* 5/10/23 [id number]                                   [address] [id number]       Patient Wound Information:      Problem List Items Addressed This Visit    None      WOUNDS REQUIRING DRESSING SUPPLIES:     Wound 25 Foot Left;Lateral #1 (Active)   Wound Image   25 0839   Wound Etiology Diabetic [place] 2 25 0839   Dressing Status Old drainage noted 25 0839   Wound Cleansed Cleansed with saline 25 0839   Wound Length (cm) 1 cm 25 0839   Wound Width (cm) 0.3 cm 25 0839   Wound Depth (cm) 1.7 cm 25 0839   Wound Surface Area (cm^2) 0.3 cm^2 25 0839   Wound Volume (cm^3) 0.51 cm^3 25 0839   Post-Procedure Length (cm) 1.1 cm 25 0901   Post-Procedure Width (cm) 0.4 cm 25 0901   Post-Procedure Depth (cm) 1.8 cm 25 0901   Post-Procedure Surface Area (cm^2) 0.44 cm^2 25 09   Post-Procedure Volume (cm^3) 0.792 cm^3 25 09   Undermining Starts ___ O'Clock 25   Undermining Ends___ O'Clock 2539   Undermining Maxium Distance (cm) 2.6 25   Wound Assessment

## 2025-06-03 NOTE — WOUND CARE
Wound Clinic Physician Orders and Discharge Instructions  [facility]  [address], [address]  [place] [address]  Telephone: [phone]     FAX [phone]    NAME:  Clarke Boyle  YOB: 1970  MEDICAL RECORD NUMBER:  [id number]  DATE:  6/3/2025      Return Appointment:    [x] Dressing Supply Provider: Kasia  [] Home Healthcare:  [x] Return Appointment: 1 Week(s)  [] Nurse Visit:     [] Discharge from [facility]:   [] Healed        [] Refer to Provider:         [] Consult    Follow-up Information:    [] Ordered Tests:    [] Vascular/Arterial Testing   [] Please call [phone] to schedule at any [facility] facility      [] Imaging:     [] Please call [phone] to schedule at any [facility] facility. X-rays do not need an appointment, you may walk in to any [facility] facility    [x] Referrals:    [] Infectious Disease  [] Vascular  [] Dermatology  [x] Other: Endocrinology 6/4      [] Rx to pharmacy:   [] Would Culture obtained in clinic  [] OR:    [] Nothing by mouth after midnight the night before the procedure  [] Other:    Wound Cleansing:   Do not scrub or use excessive force.  Cleanse wound prior to applying a clean dressing with:    [x] Normal Saline   [] Mild Soap & Water     [] Keep Wound Dry in Shower  [] Wear a cast cover to shower  [] Other:       Topical Treatments:  Do not apply lotions, creams, or ointments to wound bed unless directed.     [] Apply moisturizing lotion to skin surrounding the wound prior to dressing change.  [] Apply thin film of moisture barrier ointment (Zinc) to skin immediately around wound.  [] Apply Betadine to skin immediately around wound   [] Apply Skin Prep to skin immediately around wound  [] Other:      Dressings:           Wound Location L Foot     [x] Apply Primary Dressing:       [] MediHoney Gel  [] Calcium Alginate with Silver   [] Calcium Alginate without silver   [] Collagen with silver [] Collagen without Silver    [] Santyl

## 2025-06-04 ENCOUNTER — OFFICE VISIT (OUTPATIENT)
Age: 55
End: 2025-06-04
Payer: COMMERCIAL

## 2025-06-04 VITALS
RESPIRATION RATE: 18 BRPM | TEMPERATURE: 98.3 F | SYSTOLIC BLOOD PRESSURE: 134 MMHG | DIASTOLIC BLOOD PRESSURE: 69 MMHG | WEIGHT: 305.8 LBS | HEART RATE: 79 BPM | HEIGHT: 75 IN | BODY MASS INDEX: 38.02 KG/M2 | OXYGEN SATURATION: 97 %

## 2025-06-04 DIAGNOSIS — I10 ESSENTIAL HYPERTENSION: ICD-10-CM

## 2025-06-04 DIAGNOSIS — Z79.4 TYPE 2 DIABETES MELLITUS WITH HYPERGLYCEMIA, WITH LONG-TERM CURRENT USE OF INSULIN (HCC): Primary | ICD-10-CM

## 2025-06-04 DIAGNOSIS — E11.65 TYPE 2 DIABETES MELLITUS WITH HYPERGLYCEMIA, WITH LONG-TERM CURRENT USE OF INSULIN (HCC): Primary | ICD-10-CM

## 2025-06-04 DIAGNOSIS — E78.2 MIXED HYPERLIPIDEMIA: ICD-10-CM

## 2025-06-04 PROCEDURE — 3044F HG A1C LEVEL LT 7.0%: CPT | Performed by: INTERNAL MEDICINE

## 2025-06-04 PROCEDURE — 95251 CONT GLUC MNTR ANALYSIS I&R: CPT | Performed by: INTERNAL MEDICINE

## 2025-06-04 PROCEDURE — 3075F SYST BP GE 130 - 139MM HG: CPT | Performed by: INTERNAL MEDICINE

## 2025-06-04 PROCEDURE — G2211 COMPLEX E/M VISIT ADD ON: HCPCS | Performed by: INTERNAL MEDICINE

## 2025-06-04 PROCEDURE — 3078F DIAST BP <80 MM HG: CPT | Performed by: INTERNAL MEDICINE

## 2025-06-04 PROCEDURE — 99215 OFFICE O/P EST HI 40 MIN: CPT | Performed by: INTERNAL MEDICINE

## 2025-06-04 NOTE — PROGRESS NOTES
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     Checking blood glucose 3-4 times daily   No chest pain      Day shift - 7 A - 7 PM - 8 AM breakfast , 12 noon lunch , dinner 6 pm   Night shifts -   2 PM - 6 pm - 12 AM              Wt Readings from Last 3 Encounters:   06/04/25 (!) 138.7 kg (305 lb 12.8 oz)   06/03/25 (!) 142.9 kg (315 lb)   02/07/25 (!) 140.5 kg (309 lb 12.8 oz)        Past Medical History:   Diagnosis Date    Diabetes insipidus     HTN (hypertension)     Hyperlipidemia     Type II or unspecified type diabetes mellitus without mention of complication, uncontrolled        Current Outpatient Medications   Medication Sig    NONFORMULARY Take by mouth daily Nitric oxide 3 tabs daily    atorvastatin (LIPITOR) 10 MG tablet TAKE 1 TABLET BY MOUTH EVERY DAY AT NIGHT    Continuous Glucose Sensor (FREESTYLE KAE 3 SENSOR) MISC USE AS DIRECTED EVERY 14 DAYS. DX CODE: E11.65    TRESIBA FLEXTOUCH 200 UNIT/ML SOPN INJECT 80 UNITS SUBCUTANEOUSLY AT NIGHT    lisinopril-hydroCHLOROthiazide (PRINZIDE;ZESTORETIC) 20-25 MG per tablet TAKE 1 TABLET BY MOUTH DAILY    JARDIANCE 10 MG tablet Take 1 tablet by mouth daily    Dulaglutide (TRULICITY) 4.5 MG/0.5ML SOPN Inject 4.5 mg into the skin every 7 days    Lancets MISC Use to test blood glucose 3 times daily. Dx code E11.65    Dulaglutide 4.5 MG/0.5ML SOAJ Inject 4.5 mg into the skin every 7 days    CHROMIUM PICOLINATE PO Take 1 tablet by mouth

## 2025-06-04 NOTE — PROGRESS NOTES
Clarke Boyle is a 54 y.o. male here for   Chief Complaint   Patient presents with    Diabetes    Hypogonadism       1. Have you been to the ER, urgent care clinic since your last visit?  Hospitalized since your last visit? - [facility] ER - Foot sore 5/3/2025    2. Have you seen or consulted any other health care providers outside of the [facility] since your last visit?  Include any pap smears or colon screening.- Ortho/Podiatrist  -Foot sore

## 2025-06-10 ENCOUNTER — HOSPITAL ENCOUNTER (OUTPATIENT)
Facility: HOSPITAL | Age: 55
Discharge: HOME | End: 2025-06-10
Attending: PHYSICIAN ASSISTANT
Payer: COMMERCIAL

## 2025-06-10 VITALS — RESPIRATION RATE: 18 BRPM | TEMPERATURE: 97.7 F | DIASTOLIC BLOOD PRESSURE: 83 MMHG | SYSTOLIC BLOOD PRESSURE: 138 MMHG

## 2025-06-10 DIAGNOSIS — L97.423 DIABETIC ULCER OF LEFT MIDFOOT ASSOCIATED WITH TYPE 2 DIABETES MELLITUS, WITH NECROSIS OF MUSCLE (HCC): Primary | ICD-10-CM

## 2025-06-10 DIAGNOSIS — E11.621 DIABETIC ULCER OF LEFT MIDFOOT ASSOCIATED WITH TYPE 2 DIABETES MELLITUS, WITH NECROSIS OF MUSCLE (HCC): Primary | ICD-10-CM

## 2025-06-10 PROCEDURE — 11043 DBRDMT MUSC&/FSCA 1ST 20/<: CPT

## 2025-06-10 RX ORDER — LIDOCAINE 50 MG/G
OINTMENT TOPICAL PRN
OUTPATIENT
Start: 2025-06-10

## 2025-06-10 RX ORDER — LIDOCAINE HYDROCHLORIDE 20 MG/ML
JELLY TOPICAL PRN
OUTPATIENT
Start: 2025-06-10

## 2025-06-10 RX ORDER — LIDOCAINE 40 MG/G
CREAM TOPICAL PRN
OUTPATIENT
Start: 2025-06-10

## 2025-06-10 RX ORDER — CLOBETASOL PROPIONATE 0.5 MG/G
OINTMENT TOPICAL PRN
OUTPATIENT
Start: 2025-06-10

## 2025-06-10 RX ORDER — BETAMETHASONE DIPROPIONATE 0.5 MG/G
CREAM TOPICAL PRN
OUTPATIENT
Start: 2025-06-10

## 2025-06-10 RX ORDER — NEOMYCIN/BACITRACIN/POLYMYXINB 3.5-400-5K
OINTMENT (GRAM) TOPICAL PRN
OUTPATIENT
Start: 2025-06-10

## 2025-06-10 RX ORDER — BACITRACIN ZINC AND POLYMYXIN B SULFATE 500; 1000 [USP'U]/G; [USP'U]/G
OINTMENT TOPICAL PRN
OUTPATIENT
Start: 2025-06-10

## 2025-06-10 RX ORDER — LIDOCAINE HYDROCHLORIDE 40 MG/ML
SOLUTION TOPICAL PRN
OUTPATIENT
Start: 2025-06-10

## 2025-06-10 RX ORDER — GENTAMICIN SULFATE 1 MG/G
OINTMENT TOPICAL PRN
OUTPATIENT
Start: 2025-06-10

## 2025-06-10 RX ORDER — TRIAMCINOLONE ACETONIDE 1 MG/G
OINTMENT TOPICAL PRN
OUTPATIENT
Start: 2025-06-10

## 2025-06-10 RX ORDER — MUPIROCIN 2 %
OINTMENT (GRAM) TOPICAL PRN
OUTPATIENT
Start: 2025-06-10

## 2025-06-10 RX ORDER — GINSENG 100 MG
CAPSULE ORAL PRN
OUTPATIENT
Start: 2025-06-10

## 2025-06-10 RX ORDER — SODIUM CHLOR/HYPOCHLOROUS ACID 0.033 %
SOLUTION, IRRIGATION IRRIGATION PRN
OUTPATIENT
Start: 2025-06-10

## 2025-06-10 RX ORDER — SILVER SULFADIAZINE 10 MG/G
CREAM TOPICAL PRN
OUTPATIENT
Start: 2025-06-10

## 2025-06-10 NOTE — PROGRESS NOTES
[facility]   Wound Care and [facility]xygen Therapy Center   Medical Staff Progress Note     Clarke Boyle  MEDICAL RECORD NUMBER:  [id number]  AGE: 54 y.o.   GENDER: male  : 1970  EPISODE DATE:  6/10/2025    Chief complaint and reason for visit:     Chief Complaint   Patient presents with    Wound Check     Left foot      Patient presenting for follow up evaluation of wound(s) per chief complaint.      Subjective and ROS: Symptoms, wound related issues, or other pertinent wound history since last visit: no new wound care issues. Tolerating current treatment. No systemic complaints above baseline.    History of Wound Context: Per original history and physical on this patient. Changes in history since last evaluation: none    Medical Decision Making:     Problem List Items Addressed This Visit          Endocrine    Diabetic ulcer of left midfoot associated with type 2 diabetes mellitus, with necrosis of muscle (HCC) - Primary    Relevant Medications    lidocaine 4 % jelly    Other Relevant Orders    MD COMMUNICATION 1    MD COMMUNICATION 2    Initiate Outpatient Wound Care Protocol    Initiate Oxygen Therapy Protocol       Wounds and Treatment Plan:  X-rays show suspicion of OM.  Wound remains deep, slightly smaller.  Debrided, continue w/ same dressing.  Offloading.  MRI ordered.  RTC 1wk    Other associated diagnoses or problems addressed:  none    Pertinent imaging reviewed including independent interpretation include:  None    Pertinent labs reviewed.   Medical records and review of external note (s) from other providers done as well.    New lab or imaging orders placed:  None     Prescription drug management: N/A     Discussion of management or test interpretation with N/A.    Comorbid conditions affecting wound healing: As per PMH which was reviewed.    Risk of complications and/or mortality of patient management:  This patient has a minimal risk of morbidity and mortality from

## 2025-06-10 NOTE — WOUND CARE
Wound Clinic Physician Orders and Discharge Instructions  [facility]  [address], [address]  [place] [address]  Telephone: [phone]     FAX [phone]    NAME:  Clarke Boyle  YOB: 1970  MEDICAL RECORD NUMBER:  [id number]  DATE:  6/10/2025      Return Appointment:    [] Dressing Supply Provider: Ksaia  [] Home Healthcare:  [x] Return Appointment: 1 Week(s)  [] Nurse Visit:     [] Discharge from [facility]:   [] Healed        [] Refer to Provider:         [] Consult    Follow-up Information:    [x] Ordered Tests:    [] Vascular/Arterial Testing   [] Please call [phone] to schedule at any [facility] facility      [x] Imaging: MRI    [x] Please call [phone] to schedule at any [facility] facility.     [x] Referrals:    [] Infectious Disease  [] Vascular  [] Dermatology  [x] Other: Endocrinology 9/11      [] Rx to pharmacy:   [] Would Culture obtained in clinic  [] OR:    [] Nothing by mouth after midnight the night before the procedure  [] Other:    Wound Cleansing:   Do not scrub or use excessive force.  Cleanse wound prior to applying a clean dressing with:    [x] Normal Saline   [] Mild Soap & Water     [] Keep Wound Dry in Shower  [] Wear a cast cover to shower  [] Other:       Topical Treatments:  Do not apply lotions, creams, or ointments to wound bed unless directed.     [] Apply moisturizing lotion to skin surrounding the wound prior to dressing change.  [] Apply thin film of moisture barrier ointment (Zinc) to skin immediately around wound.  [] Apply Betadine to skin immediately around wound   [] Apply Skin Prep to skin immediately around wound  [] Other:      Dressings:           Wound Location L Foot     [x] Apply Primary Dressing:       [] MediHoney Gel  [] Calcium Alginate with Silver   [] Calcium Alginate without silver   [] Collagen with silver [] Collagen without Silver    [] Santyl with moist saline gauze     [x] Hydrofera Blue Packing (cut to

## 2025-06-10 NOTE — DISCHARGE INSTRUCTIONS
level of the heart when sitting.    [] Avoid prolonged standing in one place.    Off-Loading:   [x] Off-loading when: [x] walking  [] in bed [] sitting  [] Total non-weight bearing  [] Right Leg  [] Left Leg   [x] Assistive Device [] Walker [] Cane      [] Wheelchair  [] Crutches   [] Surgical shoe    [] Wedge Shoe  [] Foam Boot(s)  [] Knee Scooter    [] Cast Boot [] CROW Boot     [x] Diabetic Shoes    [] Offloading heel boot  [x] Other: Refer to [facility] for orthotics    Contact Cast:  Apply: [] Total Contact Cast Applied in Clinic to []RightLeg []Left Leg   [] Do not get cast wet.  Contact wound center or go to emergency room if there is a foul odor or becomes uncomfortable due to feeling tight or swelling.  Do not use objects inside of cast to scratch.      Dietary:  [] Diet as tolerated [x] Diabetic Diet [] No Added Salt  [x] Increase Protein [] Other:     Activity:  [x] Activity as tolerated  [] Patient has no activity restrictions      [] Strict Bedrest   [x] Remain off Work until follow up in clinic 7/8/25    [] May return to full duty work                                    [] Return to work with restrictions:     Physician:  [] Dr. Padmini Alston  [] Dr. Rose Arango   [] Dr. Clarence Wu  [x] Eric Leblanc PA-C  [] Dr. Phan Wilkes  [] Dr. Theodore Dailey  [] Dr. Marlin Panda    Nurse Case Manger:  REJI Arce      Wound Care Center Information: Should you experience any significant changes in your wound(s) or have questions about your wound care, please contact the Wound Center at [phone]. Our hours are Monday - Friday 8am - 4:30pm, closed all major holidays. If you need help with your wound outside these hours and cannot wait until we are again available, contact your PCP or go to the hospital emergency room.     PLEASE NOTE: IF YOU ARE UNABLE TO OBTAIN WOUND SUPPLIES, CONTINUE TO USE THE SUPPLIES YOU HAVE AVAILABLE UNTIL YOU ARE ABLE TO REACH US. IT IS MOST IMPORTANT TO KEEP THE WOUND COVERED

## 2025-06-17 ENCOUNTER — HOSPITAL ENCOUNTER (OUTPATIENT)
Facility: HOSPITAL | Age: 55
Discharge: HOME | End: 2025-06-20
Payer: COMMERCIAL

## 2025-06-17 ENCOUNTER — HOSPITAL ENCOUNTER (OUTPATIENT)
Facility: HOSPITAL | Age: 55
Discharge: HOME | End: 2025-06-17
Attending: PHYSICIAN ASSISTANT
Payer: COMMERCIAL

## 2025-06-17 VITALS
TEMPERATURE: 97.2 F | DIASTOLIC BLOOD PRESSURE: 79 MMHG | SYSTOLIC BLOOD PRESSURE: 127 MMHG | HEART RATE: 76 BPM | RESPIRATION RATE: 18 BRPM

## 2025-06-17 VITALS — BODY MASS INDEX: 38.12 KG/M2 | WEIGHT: 305 LBS

## 2025-06-17 DIAGNOSIS — L97.423 DIABETIC ULCER OF LEFT MIDFOOT ASSOCIATED WITH TYPE 2 DIABETES MELLITUS, WITH NECROSIS OF MUSCLE (HCC): Primary | ICD-10-CM

## 2025-06-17 DIAGNOSIS — L97.423 DIABETIC ULCER OF LEFT MIDFOOT ASSOCIATED WITH TYPE 2 DIABETES MELLITUS, WITH NECROSIS OF MUSCLE (HCC): ICD-10-CM

## 2025-06-17 DIAGNOSIS — E11.621 DIABETIC ULCER OF LEFT MIDFOOT ASSOCIATED WITH TYPE 2 DIABETES MELLITUS, WITH NECROSIS OF MUSCLE (HCC): ICD-10-CM

## 2025-06-17 DIAGNOSIS — E11.621 DIABETIC ULCER OF LEFT MIDFOOT ASSOCIATED WITH TYPE 2 DIABETES MELLITUS, WITH NECROSIS OF MUSCLE (HCC): Primary | ICD-10-CM

## 2025-06-17 PROCEDURE — 11043 DBRDMT MUSC&/FSCA 1ST 20/<: CPT

## 2025-06-17 PROCEDURE — 73720 MRI LWR EXTREMITY W/O&W/DYE: CPT

## 2025-06-17 PROCEDURE — A9579 GAD-BASE MR CONTRAST NOS,1ML: HCPCS | Performed by: RADIOLOGY

## 2025-06-17 PROCEDURE — 6360000004 HC RX CONTRAST MEDICATION: Performed by: RADIOLOGY

## 2025-06-17 RX ORDER — LIDOCAINE HYDROCHLORIDE 40 MG/ML
SOLUTION TOPICAL PRN
OUTPATIENT
Start: 2025-06-17

## 2025-06-17 RX ORDER — SILVER SULFADIAZINE 10 MG/G
CREAM TOPICAL PRN
OUTPATIENT
Start: 2025-06-17

## 2025-06-17 RX ORDER — GENTAMICIN SULFATE 1 MG/G
OINTMENT TOPICAL PRN
OUTPATIENT
Start: 2025-06-17

## 2025-06-17 RX ORDER — LIDOCAINE 50 MG/G
OINTMENT TOPICAL PRN
OUTPATIENT
Start: 2025-06-17

## 2025-06-17 RX ORDER — BETAMETHASONE DIPROPIONATE 0.5 MG/G
CREAM TOPICAL PRN
OUTPATIENT
Start: 2025-06-17

## 2025-06-17 RX ORDER — SODIUM CHLOR/HYPOCHLOROUS ACID 0.033 %
SOLUTION, IRRIGATION IRRIGATION PRN
OUTPATIENT
Start: 2025-06-17

## 2025-06-17 RX ORDER — LIDOCAINE HYDROCHLORIDE 20 MG/ML
JELLY TOPICAL PRN
OUTPATIENT
Start: 2025-06-17

## 2025-06-17 RX ORDER — CLOBETASOL PROPIONATE 0.5 MG/G
OINTMENT TOPICAL PRN
OUTPATIENT
Start: 2025-06-17

## 2025-06-17 RX ORDER — NEOMYCIN/BACITRACIN/POLYMYXINB 3.5-400-5K
OINTMENT (GRAM) TOPICAL PRN
OUTPATIENT
Start: 2025-06-17

## 2025-06-17 RX ORDER — TRIAMCINOLONE ACETONIDE 1 MG/G
OINTMENT TOPICAL PRN
OUTPATIENT
Start: 2025-06-17

## 2025-06-17 RX ORDER — GADOTERIDOL 279.3 MG/ML
20 INJECTION INTRAVENOUS
Status: COMPLETED | OUTPATIENT
Start: 2025-06-17 | End: 2025-06-17

## 2025-06-17 RX ORDER — GINSENG 100 MG
CAPSULE ORAL PRN
OUTPATIENT
Start: 2025-06-17

## 2025-06-17 RX ORDER — LIDOCAINE 40 MG/G
CREAM TOPICAL PRN
OUTPATIENT
Start: 2025-06-17

## 2025-06-17 RX ORDER — BACITRACIN ZINC AND POLYMYXIN B SULFATE 500; 1000 [USP'U]/G; [USP'U]/G
OINTMENT TOPICAL PRN
OUTPATIENT
Start: 2025-06-17

## 2025-06-17 RX ORDER — MUPIROCIN 2 %
OINTMENT (GRAM) TOPICAL PRN
OUTPATIENT
Start: 2025-06-17

## 2025-06-17 RX ADMIN — GADOTERIDOL 20 ML: 279.3 INJECTION, SOLUTION INTRAVENOUS at 13:12

## 2025-06-17 NOTE — WOUND CARE
Wound Clinic Physician Orders and Discharge Instructions  [facility]  [address], [address]  [place] [address]  Telephone: [phone]     FAX [phone]    NAME:  Clarke Boyle  YOB: 1970  MEDICAL RECORD NUMBER:  [id number]  DATE:  6/17/2025      Return Appointment:    [] Dressing Supply Provider: Kasia  [] Home Healthcare:  [x] Return Appointment: 1 Week(s)  [] Nurse Visit:     [] Discharge from [facility]:   [] Healed        [] Refer to Provider:         [] Consult    Follow-up Information:    [x] Ordered Tests:    [] Vascular/Arterial Testing   [] Please call [phone] to schedule at any [facility] facility      [x] Imaging: MRI 6/17/25    [] Please call [phone] to schedule at any [facility] facility.     [x] Referrals:    [] Infectious Disease  [] Vascular  [] Dermatology  [x] Other: Endocrinology 9/11      [] Rx to pharmacy:   [] Would Culture obtained in clinic  [] OR:    [] Nothing by mouth after midnight the night before the procedure  [] Other:    Wound Cleansing:   Do not scrub or use excessive force.  Cleanse wound prior to applying a clean dressing with:    [x] Normal Saline   [] Mild Soap & Water     [] Keep Wound Dry in Shower  [] Wear a cast cover to shower  [] Other:       Topical Treatments:  Do not apply lotions, creams, or ointments to wound bed unless directed.     [] Apply moisturizing lotion to skin surrounding the wound prior to dressing change.  [] Apply thin film of moisture barrier ointment (Zinc) to skin immediately around wound.  [] Apply Betadine to skin immediately around wound   [] Apply Skin Prep to skin immediately around wound  [] Other:      Dressings:           Wound Location L Foot     [x] Apply Primary Dressing:       [] MediHoney Gel  [] Calcium Alginate with Silver   [] Calcium Alginate without silver   [] Collagen with silver [] Collagen without Silver    [] Santyl with moist saline gauze     [x] Hydrofera Blue Packing

## 2025-06-17 NOTE — PROGRESS NOTES
127/79   Pulse 76   Temp 97.2 °F (36.2 °C) (Temporal)   Resp 18   Wt Readings from Last 3 Encounters:   06/04/25 (!) 138.7 kg (305 lb 12.8 oz)   06/03/25 (!) 142.9 kg (315 lb)   02/07/25 (!) 140.5 kg (309 lb 12.8 oz)       PHYSICAL EXAM  General: Alert and in no acute distress. Normal appearing  Skin: Warm and dry, no rash  Head: Normocephalic and atraumatic  Eyes: Extraocular eye movements intact, conjunctivae normal, and sclera anicteric  ENT: Hearing grossly normal bilaterally. Normal appearance  Respiratory: no chest wall tenderness. no respiratory distress  GI: Abdomen non-tender and benign  Musculoskeletal: Baseline range of motion in joints. Nontender calves. No cyanosis. Edema trace.  Neurologic: Speech normal. At baseline without new focal deficits. Mental status normal or at baseline    PAST MEDICAL HISTORY        Diagnosis Date    Diabetes insipidus     HTN (hypertension)     Hyperlipidemia     Type II or unspecified type diabetes mellitus without mention of complication, uncontrolled        PAST SURGICAL HISTORY    Past Surgical History:   Procedure Laterality Date    APPENDECTOMY  12/10/2018    CATARACT REMOVAL Left 08/2019    COLONOSCOPY      OTHER SURGICAL HISTORY      knee scope    TOE AMPUTATION  12/30/2020       FAMILY HISTORY    Family History   Problem Relation Age of Onset    Diabetes Other         son and aunts    Diabetes Sister     Cancer Brother         colon    Diabetes Mother     Cancer Mother         lung       SOCIAL HISTORY    Social History     Tobacco Use    Smoking status: Former    Smokeless tobacco: Never   Substance Use Topics    Alcohol use: Yes     Alcohol/week: 3.0 standard drinks of alcohol     Types: 3 Drinks containing 0.5 oz of alcohol per week     Comment: At social get togethers    Drug use: Not Currently       ALLERGIES    Allergies   Allergen Reactions    Shellfish Allergy Rash       MEDICATIONS    Current Outpatient Medications on File Prior to Encounter

## 2025-06-17 NOTE — DISCHARGE INSTRUCTIONS
Wound Clinic Physician Orders and Discharge Instructions  [facility]  [address], [address]  [place] [address]  Telephone: [phone]     FAX [phone]    NAME:  Clarke Boyle  YOB: 1970  MEDICAL RECORD NUMBER:  [id number]  DATE:  6/17/2025      Return Appointment:    [] Dressing Supply Provider: Kasia  [] Home Healthcare:  [x] Return Appointment: 1 Week(s)  [] Nurse Visit:     [] Discharge from [facility]:   [] Healed        [] Refer to Provider:         [] Consult    Follow-up Information:    [x] Ordered Tests:    [] Vascular/Arterial Testing   [] Please call [phone] to schedule at any [facility] facility      [x] Imaging: MRI 6/17/25    [] Please call [phone] to schedule at any [facility] facility.     [x] Referrals:    [] Infectious Disease  [] Vascular  [] Dermatology  [x] Other: Endocrinology 9/11      [] Rx to pharmacy:   [] Would Culture obtained in clinic  [] OR:    [] Nothing by mouth after midnight the night before the procedure  [] Other:    Wound Cleansing:   Do not scrub or use excessive force.  Cleanse wound prior to applying a clean dressing with:    [x] Normal Saline   [] Mild Soap & Water     [] Keep Wound Dry in Shower  [] Wear a cast cover to shower  [] Other:       Topical Treatments:  Do not apply lotions, creams, or ointments to wound bed unless directed.     [] Apply moisturizing lotion to skin surrounding the wound prior to dressing change.  [] Apply thin film of moisture barrier ointment (Zinc) to skin immediately around wound.  [] Apply Betadine to skin immediately around wound   [] Apply Skin Prep to skin immediately around wound  [] Other:      Dressings:           Wound Location L Foot     [x] Apply Primary Dressing:       [] MediHoney Gel  [] Calcium Alginate with Silver   [] Calcium Alginate without silver   [] Collagen with silver [] Collagen without Silver    [] Santyl with moist saline gauze     [x] Hydrofera Blue Packing

## 2025-06-24 ENCOUNTER — HOSPITAL ENCOUNTER (OUTPATIENT)
Facility: HOSPITAL | Age: 55
Discharge: HOME | End: 2025-06-24
Attending: PHYSICIAN ASSISTANT
Payer: COMMERCIAL

## 2025-06-24 VITALS
RESPIRATION RATE: 18 BRPM | TEMPERATURE: 98 F | SYSTOLIC BLOOD PRESSURE: 134 MMHG | DIASTOLIC BLOOD PRESSURE: 75 MMHG | HEART RATE: 72 BPM

## 2025-06-24 DIAGNOSIS — L97.423 DIABETIC ULCER OF LEFT MIDFOOT ASSOCIATED WITH TYPE 2 DIABETES MELLITUS, WITH NECROSIS OF MUSCLE (HCC): Primary | ICD-10-CM

## 2025-06-24 DIAGNOSIS — E11.621 DIABETIC ULCER OF LEFT MIDFOOT ASSOCIATED WITH TYPE 2 DIABETES MELLITUS, WITH NECROSIS OF MUSCLE (HCC): Primary | ICD-10-CM

## 2025-06-24 DIAGNOSIS — M86.172 OTHER ACUTE OSTEOMYELITIS OF LEFT FOOT (HCC): ICD-10-CM

## 2025-06-24 PROBLEM — M86.9 PYOGENIC INFLAMMATION OF BONE (HCC): Status: ACTIVE | Noted: 2025-06-24

## 2025-06-24 PROCEDURE — 11043 DBRDMT MUSC&/FSCA 1ST 20/<: CPT

## 2025-06-24 RX ORDER — GINSENG 100 MG
CAPSULE ORAL PRN
OUTPATIENT
Start: 2025-06-24

## 2025-06-24 RX ORDER — SODIUM CHLOR/HYPOCHLOROUS ACID 0.033 %
SOLUTION, IRRIGATION IRRIGATION PRN
OUTPATIENT
Start: 2025-06-24

## 2025-06-24 RX ORDER — GENTAMICIN SULFATE 1 MG/G
OINTMENT TOPICAL PRN
OUTPATIENT
Start: 2025-06-24

## 2025-06-24 RX ORDER — BACITRACIN ZINC AND POLYMYXIN B SULFATE 500; 1000 [USP'U]/G; [USP'U]/G
OINTMENT TOPICAL PRN
OUTPATIENT
Start: 2025-06-24

## 2025-06-24 RX ORDER — LIDOCAINE HYDROCHLORIDE 40 MG/ML
SOLUTION TOPICAL PRN
OUTPATIENT
Start: 2025-06-24

## 2025-06-24 RX ORDER — LIDOCAINE HYDROCHLORIDE 20 MG/ML
JELLY TOPICAL PRN
OUTPATIENT
Start: 2025-06-24

## 2025-06-24 RX ORDER — TRIAMCINOLONE ACETONIDE 1 MG/G
OINTMENT TOPICAL PRN
OUTPATIENT
Start: 2025-06-24

## 2025-06-24 RX ORDER — NEOMYCIN/BACITRACIN/POLYMYXINB 3.5-400-5K
OINTMENT (GRAM) TOPICAL PRN
OUTPATIENT
Start: 2025-06-24

## 2025-06-24 RX ORDER — CLOBETASOL PROPIONATE 0.5 MG/G
OINTMENT TOPICAL PRN
OUTPATIENT
Start: 2025-06-24

## 2025-06-24 RX ORDER — SILVER SULFADIAZINE 10 MG/G
CREAM TOPICAL PRN
OUTPATIENT
Start: 2025-06-24

## 2025-06-24 RX ORDER — LIDOCAINE 40 MG/G
CREAM TOPICAL PRN
OUTPATIENT
Start: 2025-06-24

## 2025-06-24 RX ORDER — MUPIROCIN 2 %
OINTMENT (GRAM) TOPICAL PRN
OUTPATIENT
Start: 2025-06-24

## 2025-06-24 RX ORDER — LIDOCAINE 50 MG/G
OINTMENT TOPICAL PRN
OUTPATIENT
Start: 2025-06-24

## 2025-06-24 RX ORDER — BETAMETHASONE DIPROPIONATE 0.5 MG/G
CREAM TOPICAL PRN
OUTPATIENT
Start: 2025-06-24

## 2025-06-24 NOTE — WOUND CARE
Wound Clinic Physician Orders and Discharge Instructions  [facility]  [address], [address]  [place] [address]  Telephone: [phone]     FAX [phone]    NAME:  Clarke Boyle  YOB: 1970  MEDICAL RECORD NUMBER:  [id number]  DATE:  6/24/2025      Return Appointment:    [] Dressing Supply Provider: Kasia  [] Home Healthcare:  [x] Return Appointment: 1 Week(s)  [] Nurse Visit:     [] Discharge from [facility]:   [] Healed        [] Refer to Provider:         [] Consult    Follow-up Information:    [] Ordered Tests:    [] Vascular/Arterial Testing   [] Please call [phone] to schedule at any [facility] facility      [] Imaging:     [] Please call [phone] to schedule at any [facility] facility.     [x] Referrals:    [x] Infectious Disease: Please call and make an appointment [] Vascular  [] Dermatology  [x] Other: Endocrinology 9/11      [] Rx to pharmacy:   [] Would Culture obtained in clinic  [] OR:    [] Nothing by mouth after midnight the night before the procedure  [] Other:    Wound Cleansing:   Do not scrub or use excessive force.  Cleanse wound prior to applying a clean dressing with:    [x] Normal Saline   [] Mild Soap & Water     [] Keep Wound Dry in Shower  [] Wear a cast cover to shower  [] Other:       Topical Treatments:  Do not apply lotions, creams, or ointments to wound bed unless directed.     [] Apply moisturizing lotion to skin surrounding the wound prior to dressing change.  [] Apply thin film of moisture barrier ointment (Zinc) to skin immediately around wound.  [] Apply Betadine to skin immediately around wound   [] Apply Skin Prep to skin immediately around wound  [] Other:      Dressings:           Wound Location L Foot     [x] Apply Primary Dressing:       [] MediHoney Gel  [] Calcium Alginate with Silver   [] Calcium Alginate without silver   [] Collagen with silver [] Collagen without Silver    [] Santyl with moist saline gauze     [x]

## 2025-06-24 NOTE — PROGRESS NOTES
separately)    Objective:    /75   Pulse 72   Temp 98 °F (36.7 °C) (Temporal)   Resp 18   Wt Readings from Last 3 Encounters:   06/17/25 (!) 138.3 kg (305 lb)   06/04/25 (!) 138.7 kg (305 lb 12.8 oz)   06/03/25 (!) 142.9 kg (315 lb)       PHYSICAL EXAM  General: Alert and in no acute distress. Normal appearing  Skin: Warm and dry, no rash  Head: Normocephalic and atraumatic  Eyes: Extraocular eye movements intact, conjunctivae normal, and sclera anicteric  ENT: Hearing grossly normal bilaterally. Normal appearance  Respiratory: no chest wall tenderness. no respiratory distress  GI: Abdomen non-tender and benign  Musculoskeletal: Baseline range of motion in joints. Nontender calves. No cyanosis. Edema trace.  Neurologic: Speech normal. At baseline without new focal deficits. Mental status normal or at baseline    PAST MEDICAL HISTORY        Diagnosis Date    Diabetes insipidus     HTN (hypertension)     Hyperlipidemia     Type II or unspecified type diabetes mellitus without mention of complication, uncontrolled        PAST SURGICAL HISTORY    Past Surgical History:   Procedure Laterality Date    APPENDECTOMY  12/10/2018    CATARACT REMOVAL Left 08/2019    COLONOSCOPY      OTHER SURGICAL HISTORY      knee scope    TOE AMPUTATION  12/30/2020       FAMILY HISTORY    Family History   Problem Relation Age of Onset    Diabetes Other         son and aunts    Diabetes Sister     Cancer Brother         colon    Diabetes Mother     Cancer Mother         lung       SOCIAL HISTORY    Social History     Tobacco Use    Smoking status: Former    Smokeless tobacco: Never   Substance Use Topics    Alcohol use: Yes     Alcohol/week: 3.0 standard drinks of alcohol     Types: 3 Drinks containing 0.5 oz of alcohol per week     Comment: At social get togethers    Drug use: Not Currently       ALLERGIES    Allergies   Allergen Reactions    Shellfish Allergy Rash       MEDICATIONS    Current Outpatient Medications on File Prior to

## 2025-06-24 NOTE — DISCHARGE INSTRUCTIONS
Wound Clinic Physician Orders and Discharge Instructions  [facility]  [address], [address]  [place] [address]  Telephone: [phone]     FAX [phone]    NAME:  Clarke Boyle  YOB: 1970  MEDICAL RECORD NUMBER:  [id number]  DATE:  6/24/2025      Return Appointment:    [] Dressing Supply Provider: Kasia  [] Home Healthcare:  [x] Return Appointment: 1 Week(s)  [] Nurse Visit:     [] Discharge from [facility]:   [] Healed        [] Refer to Provider:         [] Consult    Follow-up Information:    [] Ordered Tests:    [] Vascular/Arterial Testing   [] Please call [phone] to schedule at any [facility] facility      [] Imaging:     [] Please call [phone] to schedule at any [facility] facility.     [x] Referrals:    [x] Infectious Disease: Please call and make an appointment [] Vascular  [] Dermatology  [x] Other: Endocrinology 9/11      [] Rx to pharmacy:   [] Would Culture obtained in clinic  [] OR:    [] Nothing by mouth after midnight the night before the procedure  [] Other:    Wound Cleansing:   Do not scrub or use excessive force.  Cleanse wound prior to applying a clean dressing with:    [x] Normal Saline   [] Mild Soap & Water     [] Keep Wound Dry in Shower  [] Wear a cast cover to shower  [] Other:       Topical Treatments:  Do not apply lotions, creams, or ointments to wound bed unless directed.     [] Apply moisturizing lotion to skin surrounding the wound prior to dressing change.  [] Apply thin film of moisture barrier ointment (Zinc) to skin immediately around wound.  [] Apply Betadine to skin immediately around wound   [] Apply Skin Prep to skin immediately around wound  [] Other:      Dressings:           Wound Location L Foot     [x] Apply Primary Dressing:       [] MediHoney Gel  [] Calcium Alginate with Silver   [] Calcium Alginate without silver   [] Collagen with silver [] Collagen without Silver    [] Santyl with moist saline gauze     [x]

## 2025-07-01 ENCOUNTER — OFFICE VISIT (OUTPATIENT)
Age: 55
End: 2025-07-01
Payer: COMMERCIAL

## 2025-07-01 ENCOUNTER — HOSPITAL ENCOUNTER (OUTPATIENT)
Facility: HOSPITAL | Age: 55
Discharge: HOME | End: 2025-07-01
Attending: PHYSICIAN ASSISTANT
Payer: COMMERCIAL

## 2025-07-01 VITALS
HEART RATE: 75 BPM | SYSTOLIC BLOOD PRESSURE: 115 MMHG | DIASTOLIC BLOOD PRESSURE: 72 MMHG | TEMPERATURE: 97.7 F | HEIGHT: 75 IN | RESPIRATION RATE: 18 BRPM | BODY MASS INDEX: 37.8 KG/M2 | OXYGEN SATURATION: 96 % | WEIGHT: 304 LBS

## 2025-07-01 VITALS
TEMPERATURE: 97.9 F | DIASTOLIC BLOOD PRESSURE: 78 MMHG | SYSTOLIC BLOOD PRESSURE: 130 MMHG | HEART RATE: 79 BPM | RESPIRATION RATE: 18 BRPM

## 2025-07-01 DIAGNOSIS — E13.00: ICD-10-CM

## 2025-07-01 DIAGNOSIS — L97.526 DIABETIC ULCER OF OTHER PART OF LEFT FOOT ASSOCIATED WITH TYPE 2 DIABETES MELLITUS, WITH BONE INVOLVEMENT WITHOUT EVIDENCE OF NECROSIS (HCC): Primary | ICD-10-CM

## 2025-07-01 DIAGNOSIS — L97.526 DIABETIC ULCER OF OTHER PART OF LEFT FOOT ASSOCIATED WITH TYPE 2 DIABETES MELLITUS, WITH BONE INVOLVEMENT WITHOUT EVIDENCE OF NECROSIS (HCC): ICD-10-CM

## 2025-07-01 DIAGNOSIS — E11.621 DIABETIC ULCER OF OTHER PART OF LEFT FOOT ASSOCIATED WITH TYPE 2 DIABETES MELLITUS, WITH BONE INVOLVEMENT WITHOUT EVIDENCE OF NECROSIS (HCC): ICD-10-CM

## 2025-07-01 DIAGNOSIS — E11.621 DIABETIC ULCER OF OTHER PART OF LEFT FOOT ASSOCIATED WITH TYPE 2 DIABETES MELLITUS, WITH BONE INVOLVEMENT WITHOUT EVIDENCE OF NECROSIS (HCC): Primary | ICD-10-CM

## 2025-07-01 DIAGNOSIS — E11.621 DIABETIC ULCER OF LEFT MIDFOOT ASSOCIATED WITH TYPE 2 DIABETES MELLITUS, WITH NECROSIS OF MUSCLE (HCC): Primary | ICD-10-CM

## 2025-07-01 DIAGNOSIS — M86.672 CHRONIC OSTEOMYELITIS OF LEFT FOOT (HCC): ICD-10-CM

## 2025-07-01 DIAGNOSIS — L97.423 DIABETIC ULCER OF LEFT MIDFOOT ASSOCIATED WITH TYPE 2 DIABETES MELLITUS, WITH NECROSIS OF MUSCLE (HCC): Primary | ICD-10-CM

## 2025-07-01 DIAGNOSIS — M79.672 LEFT FOOT PAIN: ICD-10-CM

## 2025-07-01 PROCEDURE — 99203 OFFICE O/P NEW LOW 30 MIN: CPT | Performed by: INTERNAL MEDICINE

## 2025-07-01 PROCEDURE — 3074F SYST BP LT 130 MM HG: CPT | Performed by: INTERNAL MEDICINE

## 2025-07-01 PROCEDURE — 11043 DBRDMT MUSC&/FSCA 1ST 20/<: CPT

## 2025-07-01 PROCEDURE — 3044F HG A1C LEVEL LT 7.0%: CPT | Performed by: INTERNAL MEDICINE

## 2025-07-01 PROCEDURE — 3078F DIAST BP <80 MM HG: CPT | Performed by: INTERNAL MEDICINE

## 2025-07-01 RX ORDER — LIDOCAINE 40 MG/G
CREAM TOPICAL PRN
OUTPATIENT
Start: 2025-07-01

## 2025-07-01 RX ORDER — CLOBETASOL PROPIONATE 0.5 MG/G
OINTMENT TOPICAL PRN
OUTPATIENT
Start: 2025-07-01

## 2025-07-01 RX ORDER — BACITRACIN ZINC AND POLYMYXIN B SULFATE 500; 1000 [USP'U]/G; [USP'U]/G
OINTMENT TOPICAL PRN
OUTPATIENT
Start: 2025-07-01

## 2025-07-01 RX ORDER — MUPIROCIN 2 %
OINTMENT (GRAM) TOPICAL PRN
OUTPATIENT
Start: 2025-07-01

## 2025-07-01 RX ORDER — TRIAMCINOLONE ACETONIDE 1 MG/G
OINTMENT TOPICAL PRN
OUTPATIENT
Start: 2025-07-01

## 2025-07-01 RX ORDER — LIDOCAINE HYDROCHLORIDE 20 MG/ML
JELLY TOPICAL PRN
OUTPATIENT
Start: 2025-07-01

## 2025-07-01 RX ORDER — SILVER SULFADIAZINE 10 MG/G
CREAM TOPICAL PRN
OUTPATIENT
Start: 2025-07-01

## 2025-07-01 RX ORDER — GENTAMICIN SULFATE 1 MG/G
OINTMENT TOPICAL PRN
OUTPATIENT
Start: 2025-07-01

## 2025-07-01 RX ORDER — SODIUM CHLOR/HYPOCHLOROUS ACID 0.033 %
SOLUTION, IRRIGATION IRRIGATION PRN
OUTPATIENT
Start: 2025-07-01

## 2025-07-01 RX ORDER — NEOMYCIN/BACITRACIN/POLYMYXINB 3.5-400-5K
OINTMENT (GRAM) TOPICAL PRN
OUTPATIENT
Start: 2025-07-01

## 2025-07-01 RX ORDER — LIDOCAINE HYDROCHLORIDE 40 MG/ML
SOLUTION TOPICAL PRN
OUTPATIENT
Start: 2025-07-01

## 2025-07-01 RX ORDER — GINSENG 100 MG
CAPSULE ORAL PRN
OUTPATIENT
Start: 2025-07-01

## 2025-07-01 RX ORDER — LIDOCAINE 50 MG/G
OINTMENT TOPICAL PRN
OUTPATIENT
Start: 2025-07-01

## 2025-07-01 RX ORDER — BETAMETHASONE DIPROPIONATE 0.5 MG/G
CREAM TOPICAL PRN
OUTPATIENT
Start: 2025-07-01

## 2025-07-01 ASSESSMENT — ENCOUNTER SYMPTOMS
RESPIRATORY NEGATIVE: 1
GASTROINTESTINAL NEGATIVE: 1

## 2025-07-01 NOTE — WOUND CARE
Wound Clinic Physician Orders and Discharge Instructions  [facility]  [address], [address]  [place] [address]  Telephone: [phone]     FAX [phone]    NAME:  Clarke Boyle  YOB: 1970  MEDICAL RECORD NUMBER:  [id number]  DATE:  7/1/2025      Return Appointment:    [] Dressing Supply Provider: Kasia  [] Home Healthcare:  [x] Return Appointment: 1 Week(s)  [] Nurse Visit:     [] Discharge from [facility]:   [] Healed        [] Refer to Provider:         [] Consult    Follow-up Information:    [] Ordered Tests:    [] Vascular/Arterial Testing   [] Please call [phone] to schedule at any [facility] facility      [] Imaging:     [] Please call [phone] to schedule at any [facility] facility.     [x] Referrals:    [x] Infectious Disease: 7/1/25   [] Vascular  [] Dermatology  [x] Other: Endocrinology 9/11      [] Rx to pharmacy:   [] Would Culture obtained in clinic  [] OR:    [] Nothing by mouth after midnight the night before the procedure  [] Other:    Wound Cleansing:   Do not scrub or use excessive force.  Cleanse wound prior to applying a clean dressing with:    [x] Normal Saline   [] Mild Soap & Water     [] Keep Wound Dry in Shower  [] Wear a cast cover to shower  [] Other:       Topical Treatments:  Do not apply lotions, creams, or ointments to wound bed unless directed.     [] Apply moisturizing lotion to skin surrounding the wound prior to dressing change.  [] Apply thin film of moisture barrier ointment (Zinc) to skin immediately around wound.  [] Apply Betadine to skin immediately around wound   [] Apply Skin Prep to skin immediately around wound  [] Other:      Dressings:           Wound Location L Foot     [x] Apply Primary Dressing:       [] MediHoney Gel  [] Calcium Alginate with Silver   [] Calcium Alginate without silver   [] Collagen with silver [] Collagen without Silver    [] Santyl with moist saline gauze     [x] Hydrofera Blue Packing (cut

## 2025-07-01 NOTE — DISCHARGE INSTRUCTIONS
Wound Clinic Physician Orders and Discharge Instructions  [facility]  [address], [address]  [place] [address]  Telephone: [phone]     FAX [phone]    NAME:  Clarke Boyle  YOB: 1970  MEDICAL RECORD NUMBER:  [id number]  DATE:  7/1/2025      Return Appointment:    [] Dressing Supply Provider: Kasia  [] Home Healthcare:  [x] Return Appointment: 1 Week(s)  [] Nurse Visit:     [] Discharge from [facility]:   [] Healed        [] Refer to Provider:         [] Consult    Follow-up Information:    [] Ordered Tests:    [] Vascular/Arterial Testing   [] Please call [phone] to schedule at any [facility] facility      [] Imaging:     [] Please call [phone] to schedule at any [facility] facility.     [x] Referrals:    [x] Infectious Disease: 7/1/25   [] Vascular  [] Dermatology  [x] Other: Endocrinology 9/11      [] Rx to pharmacy:   [] Would Culture obtained in clinic  [] OR:    [] Nothing by mouth after midnight the night before the procedure  [] Other:    Wound Cleansing:   Do not scrub or use excessive force.  Cleanse wound prior to applying a clean dressing with:    [x] Normal Saline   [] Mild Soap & Water     [] Keep Wound Dry in Shower  [] Wear a cast cover to shower  [] Other:       Topical Treatments:  Do not apply lotions, creams, or ointments to wound bed unless directed.     [] Apply moisturizing lotion to skin surrounding the wound prior to dressing change.  [] Apply thin film of moisture barrier ointment (Zinc) to skin immediately around wound.  [] Apply Betadine to skin immediately around wound   [] Apply Skin Prep to skin immediately around wound  [] Other:      Dressings:           Wound Location L Foot     [x] Apply Primary Dressing:       [] MediHoney Gel  [] Calcium Alginate with Silver   [] Calcium Alginate without silver   [] Collagen with silver [] Collagen without Silver    [] Santyl with moist saline gauze     [x] Hydrofera Blue Packing (cut

## 2025-07-01 NOTE — PROGRESS NOTES
Have you been to the ER, urgent care clinic since your last visit?  Hospitalized since your last visit?   NO    Have you seen or consulted any other health care providers outside our system since your last visit?   NO    “Have you had a colorectal cancer screening such as a colonoscopy/FIT/Cologuard?    NO    No colonoscopy on file  No cologuard on file  No FIT/FOBT on file   No flexible sigmoidoscopy on file     “Have you had a diabetic eye exam?”    YES - Where: [facility]      Date of last diabetic eye exam: 5/25/2023     Chief Complaint   Patient presents with    New Patient    Wound Infection     /72 (BP Site: Left Upper Arm, Patient Position: Sitting, BP Cuff Size: Large Adult)   Pulse 75   Temp 97.7 °F (36.5 °C) (Temporal)   Resp 18   Ht 1.905 m (6' 3\")   Wt (!) 137.9 kg (304 lb)   SpO2 96%   BMI 38.00 kg/m²

## 2025-07-01 NOTE — PROGRESS NOTES
wound(s)/ulcer(s) today, debridement is required to promote healing and evaluate the wound base. Risks and benefits discussed with patient who has agreed to proceed.   Performed by: Eric Leblanc PA-C  Consent obtained:  Yes  Time out taken:  Yes  Pain Control: Anesthetic  Anesthetic: 4% Lidocaine Liquid Topical   Using curette the wound(s)/ulcer(s) was/were debrided down through and including the removal of subcutaneous tissue.      Devitalized Tissue Debrided:  fibrin, biofilm, and slough  Pre Debridement Measurements:  Are located in the Wound/Ulcer Documentation Flow Sheet  Wound/Ulcer #: 1  Post Debridement Measurements:  Wound/Ulcer Descriptions are Pre Debridement except measurements:  Total Surface Area Debrided:  5 sq cm   Diabetic/Pressure/Non Pressure Ulcers only:  Ulcer: Diabetic ulcer, muscle necrosis   Estimated Blood Loss:  Minimal  Hemostasis Achieved:  by pressure  Procedural Pain:  0  / 10   Post Procedural Pain:  0 / 10   Response to treatment:  Well tolerated by patient.     TIME: E/M Time spent with patient and/or patient care issues: [] 15-20 min  [] 21-30 min  [] 31-44 min  [] 45 min or more.   This is above the usual time needed to address patient's chief complaint today: [] Yes  [] No  This time includes physician non-face-to-face service time visit on the date of service such as  Preparing to see the patient (eg, review of tests)  Obtaining and/or reviewing separately obtained history  Performing a medically necessary appropriate examination and/or evaluation  Counseling and educating the patient/family/caregiver  Ordering medications, tests, or procedures  Referring and communicating with other health care professionals as needed  Documenting clinical information in the electronic or other health record  Independently interpreting results (not reported separately) and communicating results to the patient/family/caregiver  Care coordination (not reported separately)    Objective:    BP  Unable to determine

## 2025-07-02 NOTE — PROGRESS NOTES
HPI: 54 y.o BM referred for mgt of chronic left lateral foot ulcer/osteomyelitis   - Pt reports noticing foot ulcer about 3 months ago, started as a blister, Initially followed by Podiatry, transferred care to the [facility] last month d/t lack of progression   - MRI of left foot on 06/20 revealed cutaneous ulceration overlying the fifth metatarsal base which extends tobone and associated with findings consistent with osteomyelitis.   - He has not been on antibiotics since 05/2025.  I do not see recent wound Cx   - He reports intermittent drainage from lateral foot ulcer, has been performing local wound care per recs by the [facility]   - Pt has a longstanding h/o of uncontrolled DM, currently followed by endocrinology, A1C was 6.6% in 05/2025  - He denies acute complaints on assessment today     ROS  Review of Systems   Constitutional: Negative.    Respiratory: Negative.     Cardiovascular: Negative.    Gastrointestinal: Negative.    Genitourinary: Negative.    Musculoskeletal:         Left leg swelling    Skin: Negative.         Left lateral foot ulcer      Past Medical History:   Diagnosis Date    Diabetes insipidus     HTN (hypertension)     Hyperlipidemia     Type II or unspecified type diabetes mellitus without mention of complication, uncontrolled         Past Surgical History:   Procedure Laterality Date    AMPUTATION  Unknown    APPENDECTOMY  12/10/2018    CATARACT REMOVAL Left 08/2019    COLONOSCOPY      OTHER SURGICAL HISTORY      knee scope    TOE AMPUTATION  12/30/2020        Social History     Tobacco Use    Smoking status: Never    Smokeless tobacco: Never   Substance Use Topics    Alcohol use: Yes     Alcohol/week: 3.0 standard drinks of alcohol     Types: 3 Drinks containing 0.5 oz of alcohol per week     Comment: At social get togethers    Drug use: Not Currently     Types: Marijuana (Weed)        Family History   Problem Relation Age of Onset    Diabetes Other         son and aunts    Diabetes Sister

## 2025-07-03 ENCOUNTER — HOSPITAL ENCOUNTER (OUTPATIENT)
Facility: HOSPITAL | Age: 55
Setting detail: INFUSION SERIES
Discharge: HOME | End: 2025-07-03
Payer: COMMERCIAL

## 2025-07-03 VITALS
HEART RATE: 79 BPM | RESPIRATION RATE: 18 BRPM | HEIGHT: 75 IN | BODY MASS INDEX: 37.92 KG/M2 | TEMPERATURE: 97.7 F | OXYGEN SATURATION: 97 % | SYSTOLIC BLOOD PRESSURE: 138 MMHG | WEIGHT: 305 LBS | DIASTOLIC BLOOD PRESSURE: 78 MMHG

## 2025-07-03 LAB
ALBUMIN SERPL-MCNC: 4.1 G/DL (ref 3.8–4.9)
BASOPHILS # BLD AUTO: 0.1 X10E3/UL (ref 0–0.2)
BASOPHILS NFR BLD AUTO: 1 %
BUN SERPL-MCNC: 26 MG/DL (ref 6–24)
BUN/CREAT SERPL: 20 (ref 9–20)
CALCIUM SERPL-MCNC: 9.8 MG/DL (ref 8.7–10.2)
CHLORIDE SERPL-SCNC: 100 MMOL/L (ref 96–106)
CO2 SERPL-SCNC: 27 MMOL/L (ref 20–29)
CREAT SERPL-MCNC: 1.32 MG/DL (ref 0.76–1.27)
CRP SERPL-MCNC: 3 MG/L (ref 0–10)
EGFRCR SERPLBLD CKD-EPI 2021: 63 ML/MIN/1.73
EOSINOPHIL # BLD AUTO: 0.3 X10E3/UL (ref 0–0.4)
EOSINOPHIL NFR BLD AUTO: 3 %
ERYTHROCYTE [DISTWIDTH] IN BLOOD BY AUTOMATED COUNT: 13.5 % (ref 11.6–15.4)
ERYTHROCYTE [SEDIMENTATION RATE] IN BLOOD BY WESTERGREN METHOD: 17 MM/HR (ref 0–30)
GLUCOSE SERPL-MCNC: 107 MG/DL (ref 70–99)
HCT VFR BLD AUTO: 48.6 % (ref 37.5–51)
HGB BLD-MCNC: 15.1 G/DL (ref 13–17.7)
IMM GRANULOCYTES # BLD AUTO: 0 X10E3/UL (ref 0–0.1)
IMM GRANULOCYTES NFR BLD AUTO: 0 %
LYMPHOCYTES # BLD AUTO: 3.1 X10E3/UL (ref 0.7–3.1)
LYMPHOCYTES NFR BLD AUTO: 33 %
MCH RBC QN AUTO: 27.9 PG (ref 26.6–33)
MCHC RBC AUTO-ENTMCNC: 31.1 G/DL (ref 31.5–35.7)
MCV RBC AUTO: 90 FL (ref 79–97)
MONOCYTES # BLD AUTO: 0.6 X10E3/UL (ref 0.1–0.9)
MONOCYTES NFR BLD AUTO: 6 %
NEUTROPHILS # BLD AUTO: 5.4 X10E3/UL (ref 1.4–7)
NEUTROPHILS NFR BLD AUTO: 57 %
PHOSPHATE SERPL-MCNC: 3.3 MG/DL (ref 2.8–4.1)
PLATELET # BLD AUTO: 268 X10E3/UL (ref 150–450)
POTASSIUM SERPL-SCNC: 4.7 MMOL/L (ref 3.5–5.2)
RBC # BLD AUTO: 5.41 X10E6/UL (ref 4.14–5.8)
SODIUM SERPL-SCNC: 138 MMOL/L (ref 134–144)
WBC # BLD AUTO: 9.6 X10E3/UL (ref 3.4–10.8)

## 2025-07-03 PROCEDURE — 6360000002 HC RX W HCPCS: Performed by: INTERNAL MEDICINE

## 2025-07-03 PROCEDURE — 96365 THER/PROPH/DIAG IV INF INIT: CPT

## 2025-07-03 PROCEDURE — 36569 INSJ PICC 5 YR+ W/O IMAGING: CPT

## 2025-07-03 PROCEDURE — 2580000003 HC RX 258: Performed by: INTERNAL MEDICINE

## 2025-07-03 RX ADMIN — PIPERACILLIN SODIUM AND TAZOBACTAM SODIUM 3375 MG: 3; .375 INJECTION, POWDER, LYOPHILIZED, FOR SOLUTION INTRAVENOUS at 11:55

## 2025-07-03 ASSESSMENT — PAIN SCALES - GENERAL
PAINLEVEL_OUTOF10: 0
PAINLEVEL_OUTOF10: 0

## 2025-07-03 NOTE — PROCEDURES
PROCEDURE NOTE  Date: 7/3/2025   Name: Clarke Boyle  YOB: 1970    Procedures    PICC Line Insertion Procedure Note    Procedure: Insertion of #4 FR/18G PICC    Indications:  Long Term IV therapy    Procedure Details   Informed consent was obtained for the procedure, including sedation.  Risks of lung perforation, hemorrhage, and adverse drug reaction were discussed.     #4 FR/18G PICC inserted to the R Basilic vein per hospital protocol.   Blood return:  yes    Findings:  Catheter inserted to 49 cm, with 0 cm. Exposed. Mid upper arm circumference is 40 cm.  There were no changes to vital signs. Catheter was flushed with 10 cc NS. Patient did tolerate procedure well.    Recommendations:    PICC Brochure given to patient with teaching instruction.

## 2025-07-03 NOTE — DISCHARGE INSTRUCTIONS
Last dose of Zosyn 3,375 mg IV received today on 7-3-25 at 11:55 a.m.   Next dose available as directed by physician

## 2025-07-03 NOTE — PROGRESS NOTES
Patient received first dose of IV zosyn per physician's order. Patient alert and oriented x 4 with respirations even and unlabored on RA with no signs or symptoms of an infusion reaction noted. Patient declines to stay 30 minute observation period post infusion. Patient ambulating with steady gait noted to front hospital entrance for discharge.

## 2025-07-05 LAB
BACTERIA SPEC AEROBE CULT: ABNORMAL
BACTERIA SPEC AEROBE CULT: ABNORMAL
BACTERIA SPEC ANAEROBE CULT: ABNORMAL

## 2025-07-07 DIAGNOSIS — E11.65 TYPE 2 DIABETES MELLITUS WITH HYPERGLYCEMIA, UNSPECIFIED WHETHER LONG TERM INSULIN USE (HCC): ICD-10-CM

## 2025-07-07 RX ORDER — ACYCLOVIR 400 MG/1
TABLET ORAL
Qty: 9 EACH | Refills: 3 | Status: SHIPPED | OUTPATIENT
Start: 2025-07-07

## 2025-07-08 ENCOUNTER — HOSPITAL ENCOUNTER (OUTPATIENT)
Facility: HOSPITAL | Age: 55
Discharge: HOME | End: 2025-07-08
Attending: PHYSICIAN ASSISTANT
Payer: COMMERCIAL

## 2025-07-08 VITALS
HEART RATE: 79 BPM | TEMPERATURE: 97.7 F | SYSTOLIC BLOOD PRESSURE: 135 MMHG | RESPIRATION RATE: 18 BRPM | DIASTOLIC BLOOD PRESSURE: 77 MMHG

## 2025-07-08 DIAGNOSIS — E11.621 DIABETIC ULCER OF LEFT MIDFOOT ASSOCIATED WITH TYPE 2 DIABETES MELLITUS, WITH NECROSIS OF MUSCLE (HCC): Primary | ICD-10-CM

## 2025-07-08 DIAGNOSIS — L97.423 DIABETIC ULCER OF LEFT MIDFOOT ASSOCIATED WITH TYPE 2 DIABETES MELLITUS, WITH NECROSIS OF MUSCLE (HCC): Primary | ICD-10-CM

## 2025-07-08 LAB
BUN BLDV-MCNC: 26 MG/DL
C-REACTIVE PROTEIN: 6
CALCIUM SERPL-MCNC: 9.1 MG/DL
CHLORIDE BLD-SCNC: 101 MMOL/L
CO2: 23 MMOL/L
CREAT SERPL-MCNC: 1.58 MG/DL
EGFR: 51
GLUCOSE BLD-MCNC: 126 MG/DL
POTASSIUM SERPL-SCNC: 4.1 MMOL/L
SED RATE, AUTOMATED: 48
SODIUM BLD-SCNC: 139 MMOL/L

## 2025-07-08 PROCEDURE — 11043 DBRDMT MUSC&/FSCA 1ST 20/<: CPT

## 2025-07-08 RX ORDER — TRIAMCINOLONE ACETONIDE 1 MG/G
OINTMENT TOPICAL PRN
OUTPATIENT
Start: 2025-07-08

## 2025-07-08 RX ORDER — MUPIROCIN 2 %
OINTMENT (GRAM) TOPICAL PRN
OUTPATIENT
Start: 2025-07-08

## 2025-07-08 RX ORDER — BETAMETHASONE DIPROPIONATE 0.5 MG/G
CREAM TOPICAL PRN
OUTPATIENT
Start: 2025-07-08

## 2025-07-08 RX ORDER — NEOMYCIN/BACITRACIN/POLYMYXINB 3.5-400-5K
OINTMENT (GRAM) TOPICAL PRN
OUTPATIENT
Start: 2025-07-08

## 2025-07-08 RX ORDER — GINSENG 100 MG
CAPSULE ORAL PRN
OUTPATIENT
Start: 2025-07-08

## 2025-07-08 RX ORDER — LIDOCAINE HYDROCHLORIDE 20 MG/ML
JELLY TOPICAL PRN
OUTPATIENT
Start: 2025-07-08

## 2025-07-08 RX ORDER — LIDOCAINE 50 MG/G
OINTMENT TOPICAL PRN
OUTPATIENT
Start: 2025-07-08

## 2025-07-08 RX ORDER — SILVER SULFADIAZINE 10 MG/G
CREAM TOPICAL PRN
OUTPATIENT
Start: 2025-07-08

## 2025-07-08 RX ORDER — LIDOCAINE 40 MG/G
CREAM TOPICAL PRN
OUTPATIENT
Start: 2025-07-08

## 2025-07-08 RX ORDER — CLOBETASOL PROPIONATE 0.5 MG/G
OINTMENT TOPICAL PRN
OUTPATIENT
Start: 2025-07-08

## 2025-07-08 RX ORDER — BACITRACIN ZINC AND POLYMYXIN B SULFATE 500; 1000 [USP'U]/G; [USP'U]/G
OINTMENT TOPICAL PRN
OUTPATIENT
Start: 2025-07-08

## 2025-07-08 RX ORDER — LIDOCAINE HYDROCHLORIDE 40 MG/ML
SOLUTION TOPICAL PRN
OUTPATIENT
Start: 2025-07-08

## 2025-07-08 RX ORDER — SODIUM CHLOR/HYPOCHLOROUS ACID 0.033 %
SOLUTION, IRRIGATION IRRIGATION PRN
OUTPATIENT
Start: 2025-07-08

## 2025-07-08 RX ORDER — GENTAMICIN SULFATE 1 MG/G
OINTMENT TOPICAL PRN
OUTPATIENT
Start: 2025-07-08

## 2025-07-08 NOTE — DISCHARGE INSTRUCTIONS
Alessia Bueno RN  Registered Nurse     Wound Care     Signed     Date of Service: 7/8/2025 10:00 AM     Signed                          Wound Clinic Physician Orders and Discharge Instructions  [facility]  [facility] [address]  [place] [address]  Telephone: [phone]     FAX [phone]     NAME:  Clarke Boyle  YOB: 1970  MEDICAL RECORD NUMBER:  [id number]  DATE:  7/8/2025        Return Appointment:     [] Dressing Supply Provider: Kasia  [] Home Healthcare:  [x] Return Appointment: 1 Week(s)  [] Nurse Visit:      [] Discharge from [facility]:   [] Healed        [] Refer to Provider:         [] Consult     Follow-up Information:     [] Ordered Tests:    [] Vascular/Arterial Testing    [] Please call [phone] to schedule at any [facility] facility       [] Imaging:                           [] Please call [phone] to schedule at any [facility] facility.      [x] Referrals:    [x] Infectious Disease: DR. YOUNG       [] Vascular                 [] Dermatology               [x] Other: Endocrinology 9/11        [] Rx to pharmacy:   [] Would Culture obtained in clinic  [] OR:                          [] Nothing by mouth after midnight the night before the procedure  [] Other:     Wound Cleansing:   Do not scrub or use excessive force.  Cleanse wound prior to applying a clean dressing with:     [x] Normal Saline          [] Mild Soap & Water     [] Keep Wound Dry in Shower  [] Wear a cast cover to shower  [] Other:        Topical Treatments:  Do not apply lotions, creams, or ointments to wound bed unless directed.      [] Apply moisturizing lotion to skin surrounding the wound prior to dressing change.  [] Apply thin film of moisture barrier ointment (Zinc) to skin immediately around wound.  [] Apply Betadine to skin immediately around wound   [] Apply Skin Prep to skin immediately around wound  [] Other:                 Dressings:                  Wound Location

## 2025-07-08 NOTE — WOUND CARE
Wound Clinic Physician Orders and Discharge Instructions  [facility]  [address], [address]  [place] [address]  Telephone: [phone]     FAX [phone]    NAME:  Clarke Boyle  YOB: 1970  MEDICAL RECORD NUMBER:  [id number]  DATE:  7/8/2025      Return Appointment:    [] Dressing Supply Provider: Kasia  [] Home Healthcare:  [x] Return Appointment: 1 Week(s)  [] Nurse Visit:     [] Discharge from [facility]:   [] Healed        [] Refer to Provider:         [] Consult    Follow-up Information:    [] Ordered Tests:    [] Vascular/Arterial Testing   [] Please call [phone] to schedule at any [facility] facility      [] Imaging:     [] Please call [phone] to schedule at any [facility] facility.     [x] Referrals:    [x] Infectious Disease: DR. YOUNG   [] Vascular  [] Dermatology  [x] Other: Endocrinology 9/11      [] Rx to pharmacy:   [] Would Culture obtained in clinic  [] OR:    [] Nothing by mouth after midnight the night before the procedure  [] Other:    Wound Cleansing:   Do not scrub or use excessive force.  Cleanse wound prior to applying a clean dressing with:    [x] Normal Saline   [] Mild Soap & Water     [] Keep Wound Dry in Shower  [] Wear a cast cover to shower  [] Other:       Topical Treatments:  Do not apply lotions, creams, or ointments to wound bed unless directed.     [] Apply moisturizing lotion to skin surrounding the wound prior to dressing change.  [] Apply thin film of moisture barrier ointment (Zinc) to skin immediately around wound.  [] Apply Betadine to skin immediately around wound   [] Apply Skin Prep to skin immediately around wound  [] Other:      Dressings:           Wound Location L Foot     [x] Apply Primary Dressing:       [] MediHoney Gel  [] Calcium Alginate with Silver   [] Calcium Alginate without silver   [] Collagen with silver [] Collagen without Silver    [] Santyl with moist saline gauze     [x] Hydrofera Blue Packing

## 2025-07-08 NOTE — PROGRESS NOTES
[facility]   Wound Care and Hyperbaric Oxygen Therapy Center   Medical Staff Progress Note     Clarke Boyle  MEDICAL RECORD NUMBER:  [id number]  AGE: 54 y.o.   GENDER: male  : 1970  EPISODE DATE:  2025    Chief complaint and reason for visit:     Chief Complaint   Patient presents with    Wound Check     L foot      Patient presenting for follow up evaluation of wound(s) per chief complaint.      Subjective and ROS: Symptoms, wound related issues, or other pertinent wound history since last visit: no new wound care issues. Tolerating current treatment. No systemic complaints above baseline.    History of Wound Context: Per original history and physical on this patient. Changes in history since last evaluation: none    Medical Decision Making:     Problem List Items Addressed This Visit          Endocrine    Diabetic ulcer of left midfoot associated with type 2 diabetes mellitus, with necrosis of muscle (HCC) - Primary    Relevant Medications    lidocaine 4 % jelly    Other Relevant Orders    MD COMMUNICATION 1    MD COMMUNICATION 2    Initiate Outpatient Wound Care Protocol    Initiate Oxygen Therapy Protocol       Wounds and Treatment Plan:  Just saw ID, now on PICC antibiotics.  Wound is measuring smaller.  Debrided, continue same dressings.  RTC 1wk    Other associated diagnoses or problems addressed:  none    Pertinent imaging reviewed including independent interpretation include:  None    Pertinent labs reviewed.   Medical records and review of external note (s) from other providers done as well.    New lab or imaging orders placed:  None     Prescription drug management: N/A     Discussion of management or test interpretation with N/A.    Comorbid conditions affecting wound healing: As per PMH which was reviewed.    Risk of complications and/or mortality of patient management:  This patient has a minimal risk of morbidity and mortality from additional diagnostic testing or

## 2025-07-15 ENCOUNTER — HOSPITAL ENCOUNTER (OUTPATIENT)
Facility: HOSPITAL | Age: 55
Discharge: HOME | End: 2025-07-15
Attending: PHYSICIAN ASSISTANT
Payer: COMMERCIAL

## 2025-07-15 VITALS
HEART RATE: 72 BPM | TEMPERATURE: 97.2 F | RESPIRATION RATE: 18 BRPM | DIASTOLIC BLOOD PRESSURE: 69 MMHG | SYSTOLIC BLOOD PRESSURE: 127 MMHG

## 2025-07-15 DIAGNOSIS — L97.423 DIABETIC ULCER OF LEFT MIDFOOT ASSOCIATED WITH TYPE 2 DIABETES MELLITUS, WITH NECROSIS OF MUSCLE (HCC): Primary | ICD-10-CM

## 2025-07-15 DIAGNOSIS — E11.621 DIABETIC ULCER OF LEFT MIDFOOT ASSOCIATED WITH TYPE 2 DIABETES MELLITUS, WITH NECROSIS OF MUSCLE (HCC): Primary | ICD-10-CM

## 2025-07-15 LAB
BASOPHILS ABSOLUTE: 0.1 /ΜL
BASOPHILS RELATIVE PERCENT: 1 %
BUN BLDV-MCNC: 32 MG/DL
C-REACTIVE PROTEIN: 2
CALCIUM SERPL-MCNC: 9 MG/DL
CHLORIDE BLD-SCNC: 101 MMOL/L
CO2: 20 MMOL/L
CREAT SERPL-MCNC: 1.28 MG/DL
EGFR: 66
EOSINOPHILS ABSOLUTE: 0.3 /ΜL
EOSINOPHILS RELATIVE PERCENT: 3 %
GLUCOSE BLD-MCNC: 220 MG/DL
HCT VFR BLD CALC: 44.4 % (ref 41–53)
HEMOGLOBIN: 13.8 G/DL (ref 13.5–17.5)
LYMPHOCYTES ABSOLUTE: 3 /ΜL
LYMPHOCYTES RELATIVE PERCENT: 33 %
MCH RBC QN AUTO: 27.7 PG
MCHC RBC AUTO-ENTMCNC: 31.1 G/DL
MCV RBC AUTO: 89 FL
MONOCYTES ABSOLUTE: 0.5 /ΜL
MONOCYTES RELATIVE PERCENT: 6 %
NEUTROPHILS ABSOLUTE: 5.2 /ΜL
NEUTROPHILS RELATIVE PERCENT: 56 %
PLATELET # BLD: 206 K/ΜL
PMV BLD AUTO: NORMAL FL
POTASSIUM SERPL-SCNC: 4 MMOL/L
RBC # BLD: 4.99 10^6/ΜL
SED RATE, AUTOMATED: 51
SODIUM BLD-SCNC: 135 MMOL/L
WBC # BLD: 9.1 10^3/ML

## 2025-07-15 PROCEDURE — 11043 DBRDMT MUSC&/FSCA 1ST 20/<: CPT

## 2025-07-15 RX ORDER — SODIUM CHLOR/HYPOCHLOROUS ACID 0.033 %
SOLUTION, IRRIGATION IRRIGATION PRN
OUTPATIENT
Start: 2025-07-15

## 2025-07-15 RX ORDER — GINSENG 100 MG
CAPSULE ORAL PRN
OUTPATIENT
Start: 2025-07-15

## 2025-07-15 RX ORDER — TRIAMCINOLONE ACETONIDE 1 MG/G
OINTMENT TOPICAL PRN
OUTPATIENT
Start: 2025-07-15

## 2025-07-15 RX ORDER — CLOBETASOL PROPIONATE 0.5 MG/G
OINTMENT TOPICAL PRN
OUTPATIENT
Start: 2025-07-15

## 2025-07-15 RX ORDER — LIDOCAINE 40 MG/G
CREAM TOPICAL PRN
OUTPATIENT
Start: 2025-07-15

## 2025-07-15 RX ORDER — LIDOCAINE HYDROCHLORIDE 20 MG/ML
JELLY TOPICAL PRN
OUTPATIENT
Start: 2025-07-15

## 2025-07-15 RX ORDER — GENTAMICIN SULFATE 1 MG/G
OINTMENT TOPICAL PRN
OUTPATIENT
Start: 2025-07-15

## 2025-07-15 RX ORDER — NEOMYCIN/BACITRACIN/POLYMYXINB 3.5-400-5K
OINTMENT (GRAM) TOPICAL PRN
OUTPATIENT
Start: 2025-07-15

## 2025-07-15 RX ORDER — BACITRACIN ZINC AND POLYMYXIN B SULFATE 500; 1000 [USP'U]/G; [USP'U]/G
OINTMENT TOPICAL PRN
OUTPATIENT
Start: 2025-07-15

## 2025-07-15 RX ORDER — BETAMETHASONE DIPROPIONATE 0.5 MG/G
CREAM TOPICAL PRN
OUTPATIENT
Start: 2025-07-15

## 2025-07-15 RX ORDER — LIDOCAINE HYDROCHLORIDE 40 MG/ML
SOLUTION TOPICAL PRN
OUTPATIENT
Start: 2025-07-15

## 2025-07-15 RX ORDER — LIDOCAINE 50 MG/G
OINTMENT TOPICAL PRN
OUTPATIENT
Start: 2025-07-15

## 2025-07-15 RX ORDER — MUPIROCIN 2 %
OINTMENT (GRAM) TOPICAL PRN
OUTPATIENT
Start: 2025-07-15

## 2025-07-15 RX ORDER — SILVER SULFADIAZINE 10 MG/G
CREAM TOPICAL PRN
OUTPATIENT
Start: 2025-07-15

## 2025-07-15 NOTE — WOUND CARE
Wound Clinic Physician Orders and Discharge Instructions  [facility]  [address], [address]  [place] [address]  Telephone: [phone]     FAX [phone]    NAME:  Clarke Boyle  YOB: 1970  MEDICAL RECORD NUMBER:  [id number]  DATE:  7/15/2025      Return Appointment:    [] Dressing Supply Provider: Kasia  [] Home Healthcare:  [x] Return Appointment: 1 Week(s)  [] Nurse Visit:     [] Discharge from [facility]:   [] Healed        [] Refer to Provider:         [] Consult    Follow-up Information:    [] Ordered Tests:    [] Vascular/Arterial Testing   [] Please call [phone] to schedule at any [facility] facility      [] Imaging:     [] Please call [phone] to schedule at any [facility] facility.     [x] Referrals:    [x] Infectious Disease: DR. YOUNG   [] Vascular  [] Dermatology  [x] Other: Endocrinology 9/11      [] Rx to pharmacy:   [] Would Culture obtained in clinic  [] OR:    [] Nothing by mouth after midnight the night before the procedure  [] Other:    Wound Cleansing:   Do not scrub or use excessive force.  Cleanse wound prior to applying a clean dressing with:    [x] Normal Saline OR   [x] Mild Soap & Water     [] Keep Wound Dry in Shower  [] Wear a cast cover to shower  [] Other:       Topical Treatments:  Do not apply lotions, creams, or ointments to wound bed unless directed.     [] Apply moisturizing lotion to skin surrounding the wound prior to dressing change.  [] Apply thin film of moisture barrier ointment (Zinc) to skin immediately around wound.  [] Apply Betadine to skin immediately around wound   [] Apply Skin Prep to skin immediately around wound  [] Other:      Dressings:           Wound Location L Foot     [x] Apply Primary Dressing:       [] MediHoney Gel  [] Calcium Alginate with Silver   [] Calcium Alginate without silver   [] Collagen with silver [] Collagen without Silver    [] Santyl with moist saline gauze     [x] Hydrofera Blue

## 2025-07-15 NOTE — DISCHARGE INSTRUCTIONS
Alessia Bueno RN  Registered Nurse     Wound Care     Signed     Date of Service: 7/15/2025 10:15 AM     Signed                          Wound Clinic Physician Orders and Discharge Instructions  [facility]  [facility] [address]  [place] [id number]  Telephone: [phone]     FAX [phone]     NAME:  Clarke Boyle  YOB: 1970  MEDICAL RECORD NUMBER:  [id number]  DATE:  7/15/2025        Return Appointment:     [] Dressing Supply Provider: Kasia  [] Home Healthcare:  [x] Return Appointment: 1 Week(s)  [] Nurse Visit:      [] Discharge from [facility]:   [] Healed        [] Refer to Provider:         [] Consult     Follow-up Information:     [] Ordered Tests:    [] Vascular/Arterial Testing    [] Please call [phone] to schedule at any [facility] facility       [] Imaging:                           [] Please call [phone] to schedule at any [facility] facility.      [x] Referrals:    [x] Infectious Disease: DR. YOUNG       [] Vascular                 [] Dermatology               [x] Other: Endocrinology 9/11        [] Rx to pharmacy:   [] Would Culture obtained in clinic  [] OR:                          [] Nothing by mouth after midnight the night before the procedure  [] Other:     Wound Cleansing:   Do not scrub or use excessive force.  Cleanse wound prior to applying a clean dressing with:     [x] Normal Saline OR    [x] Mild Soap & Water     [] Keep Wound Dry in Shower  [] Wear a cast cover to shower  [] Other:        Topical Treatments:  Do not apply lotions, creams, or ointments to wound bed unless directed.      [] Apply moisturizing lotion to skin surrounding the wound prior to dressing change.  [] Apply thin film of moisture barrier ointment (Zinc) to skin immediately around wound.  [] Apply Betadine to skin immediately around wound   [] Apply Skin Prep to skin immediately around wound  [] Other:                 Dressings:                  Wound Location

## 2025-07-15 NOTE — PROGRESS NOTES
interpreting results (not reported separately) and communicating results to the patient/family/caregiver  Care coordination (not reported separately)    Objective:    /69   Pulse 72   Temp 97.2 °F (36.2 °C) (Temporal)   Resp 18   Wt Readings from Last 3 Encounters:   07/03/25 (!) 138.3 kg (305 lb)   07/01/25 (!) 137.9 kg (304 lb)   06/17/25 (!) 138.3 kg (305 lb)       PHYSICAL EXAM  General: Alert and in no acute distress. Normal appearing  Skin: Warm and dry, no rash  Head: Normocephalic and atraumatic  Eyes: Extraocular eye movements intact, conjunctivae normal, and sclera anicteric  ENT: Hearing grossly normal bilaterally. Normal appearance  Respiratory: no chest wall tenderness. no respiratory distress  GI: Abdomen non-tender and benign  Musculoskeletal: Baseline range of motion in joints. Nontender calves. No cyanosis. Edema trace.  Neurologic: Speech normal. At baseline without new focal deficits. Mental status normal or at baseline    PAST MEDICAL HISTORY        Diagnosis Date    Diabetes insipidus     HTN (hypertension)     Hyperlipidemia     Type II or unspecified type diabetes mellitus without mention of complication, uncontrolled        PAST SURGICAL HISTORY    Past Surgical History:   Procedure Laterality Date    AMPUTATION  Unknown    APPENDECTOMY  12/10/2018    CATARACT REMOVAL Left 08/2019    COLONOSCOPY      OTHER SURGICAL HISTORY      knee scope    TOE AMPUTATION  12/30/2020       FAMILY HISTORY    Family History   Problem Relation Age of Onset    Diabetes Other         son and aunts    Diabetes Sister     Cancer Brother         colon    Diabetes Mother     Cancer Mother         lung       SOCIAL HISTORY    Social History     Tobacco Use    Smoking status: Never    Smokeless tobacco: Never   Substance Use Topics    Alcohol use: Yes     Alcohol/week: 3.0 standard drinks of alcohol     Types: 3 Drinks containing 0.5 oz of alcohol per week     Comment: At social get togethers    Drug use: Not

## 2025-07-15 NOTE — WOUND CARE
Wound Care Supplies      Supply Company:     [employer] [address] [place] [address] f: [phone] f: [phone] p: [phone] [email or web address]      Ordering Center:     [facility]  [address] [place] [address], [address] [place] [address]    Phone: [phone]  Fax: [phone]    Patient Information:      Kaylee Pitt  [address] [facility] [id number] [phone]   : 1970  AGE: 54 y.o.     GENDER: male   EPISODE DATE: 7/15/2025    Insurance:      PRIMARY INSURANCE:  Plan: Public Mobile  Coverage: UsingMilesBS  Effective Date: 5/10/2023  Group Number: [unfilled]  Subscriber Number: [id number] - ([place] [employer])    Payer/Plan Subscr  Sex Relation Sub. Ins. ID Effective Group Num   1. [place] * KAYLEE PITT 1970 Male Self [id number]* 5/10/23 [id number]                                   [address] [address]       Patient Wound Information:      Problem List Items Addressed This Visit          Endocrine    Diabetic ulcer of left midfoot associated with type 2 diabetes mellitus, with necrosis of muscle (HCC) - Primary    Relevant Medications    lidocaine 4 % jelly    Other Relevant Orders    MD COMMUNICATION 1    MD COMMUNICATION 2    Initiate Outpatient Wound Care Protocol    Initiate Oxygen Therapy Protocol    Amb External Referral To Vascular Surgery       WOUNDS REQUIRING DRESSING SUPPLIES:     Wound 25 Foot Left;Lateral #1 (Active)   Wound Image   07/15/25 1029   Wound Etiology Diabetic [place] 3 07/15/25 1029   Dressing Status Clean;Dry;Intact 07/15/25 1029   Wound Cleansed Cleansed with saline 07/15/25 1029   Dressing/Treatment Hydrofera blue;Gauze dressing/dressing sponge;Dry dressing 25 1035   Offloading for Diabetic Foot Ulcers Diabetic shoes/inserts 07/15/25 1029   Wound Length (cm) 2.2 cm 07/15/25 1029   Wound Width (cm) 2.1 cm 07/15/25 1029   Wound Depth (cm) 1.6 cm 07/15/25

## 2025-07-22 ENCOUNTER — OFFICE VISIT (OUTPATIENT)
Age: 55
End: 2025-07-22
Payer: COMMERCIAL

## 2025-07-22 ENCOUNTER — HOSPITAL ENCOUNTER (OUTPATIENT)
Facility: HOSPITAL | Age: 55
Discharge: HOME | End: 2025-07-22
Attending: PHYSICIAN ASSISTANT
Payer: COMMERCIAL

## 2025-07-22 VITALS
DIASTOLIC BLOOD PRESSURE: 67 MMHG | BODY MASS INDEX: 37.42 KG/M2 | HEIGHT: 75 IN | TEMPERATURE: 97.4 F | RESPIRATION RATE: 18 BRPM | WEIGHT: 301 LBS | OXYGEN SATURATION: 96 % | HEART RATE: 79 BPM | SYSTOLIC BLOOD PRESSURE: 112 MMHG

## 2025-07-22 VITALS
RESPIRATION RATE: 18 BRPM | TEMPERATURE: 97.3 F | DIASTOLIC BLOOD PRESSURE: 74 MMHG | HEART RATE: 77 BPM | SYSTOLIC BLOOD PRESSURE: 117 MMHG

## 2025-07-22 DIAGNOSIS — E11.621 DIABETIC ULCER OF LEFT MIDFOOT ASSOCIATED WITH TYPE 2 DIABETES MELLITUS, WITH NECROSIS OF MUSCLE (HCC): Primary | ICD-10-CM

## 2025-07-22 DIAGNOSIS — M79.672 LEFT FOOT PAIN: ICD-10-CM

## 2025-07-22 DIAGNOSIS — L97.524 CHRONIC FOOT ULCER WITH NECROSIS OF BONE, LEFT (HCC): ICD-10-CM

## 2025-07-22 DIAGNOSIS — M86.672 OTHER CHRONIC OSTEOMYELITIS OF LEFT FOOT (HCC): Primary | ICD-10-CM

## 2025-07-22 DIAGNOSIS — G62.9 PERIPHERAL POLYNEUROPATHY: ICD-10-CM

## 2025-07-22 DIAGNOSIS — L97.423 DIABETIC ULCER OF LEFT MIDFOOT ASSOCIATED WITH TYPE 2 DIABETES MELLITUS, WITH NECROSIS OF MUSCLE (HCC): Primary | ICD-10-CM

## 2025-07-22 PROCEDURE — 3074F SYST BP LT 130 MM HG: CPT | Performed by: INTERNAL MEDICINE

## 2025-07-22 PROCEDURE — 11043 DBRDMT MUSC&/FSCA 1ST 20/<: CPT

## 2025-07-22 PROCEDURE — 3078F DIAST BP <80 MM HG: CPT | Performed by: INTERNAL MEDICINE

## 2025-07-22 PROCEDURE — 99214 OFFICE O/P EST MOD 30 MIN: CPT | Performed by: INTERNAL MEDICINE

## 2025-07-22 RX ORDER — GINSENG 100 MG
CAPSULE ORAL PRN
OUTPATIENT
Start: 2025-07-22

## 2025-07-22 RX ORDER — NEOMYCIN/BACITRACIN/POLYMYXINB 3.5-400-5K
OINTMENT (GRAM) TOPICAL PRN
OUTPATIENT
Start: 2025-07-22

## 2025-07-22 RX ORDER — LIDOCAINE 50 MG/G
OINTMENT TOPICAL PRN
OUTPATIENT
Start: 2025-07-22

## 2025-07-22 RX ORDER — LIDOCAINE HYDROCHLORIDE 20 MG/ML
JELLY TOPICAL PRN
OUTPATIENT
Start: 2025-07-22

## 2025-07-22 RX ORDER — SILVER SULFADIAZINE 10 MG/G
CREAM TOPICAL PRN
OUTPATIENT
Start: 2025-07-22

## 2025-07-22 RX ORDER — BETAMETHASONE DIPROPIONATE 0.5 MG/G
CREAM TOPICAL PRN
OUTPATIENT
Start: 2025-07-22

## 2025-07-22 RX ORDER — TRIAMCINOLONE ACETONIDE 1 MG/G
OINTMENT TOPICAL PRN
OUTPATIENT
Start: 2025-07-22

## 2025-07-22 RX ORDER — BACITRACIN ZINC AND POLYMYXIN B SULFATE 500; 1000 [USP'U]/G; [USP'U]/G
OINTMENT TOPICAL PRN
OUTPATIENT
Start: 2025-07-22

## 2025-07-22 RX ORDER — LIDOCAINE HYDROCHLORIDE 40 MG/ML
SOLUTION TOPICAL PRN
OUTPATIENT
Start: 2025-07-22

## 2025-07-22 RX ORDER — GENTAMICIN SULFATE 1 MG/G
OINTMENT TOPICAL PRN
OUTPATIENT
Start: 2025-07-22

## 2025-07-22 RX ORDER — CLOBETASOL PROPIONATE 0.5 MG/G
OINTMENT TOPICAL PRN
OUTPATIENT
Start: 2025-07-22

## 2025-07-22 RX ORDER — LIDOCAINE 40 MG/G
CREAM TOPICAL PRN
OUTPATIENT
Start: 2025-07-22

## 2025-07-22 RX ORDER — SODIUM CHLOR/HYPOCHLOROUS ACID 0.033 %
SOLUTION, IRRIGATION IRRIGATION PRN
OUTPATIENT
Start: 2025-07-22

## 2025-07-22 RX ORDER — MUPIROCIN 2 %
OINTMENT (GRAM) TOPICAL PRN
OUTPATIENT
Start: 2025-07-22

## 2025-07-22 ASSESSMENT — ENCOUNTER SYMPTOMS: GASTROINTESTINAL NEGATIVE: 1

## 2025-07-22 NOTE — DISCHARGE INSTRUCTIONS
Strict Bedrest           [x] Remain off Work until follow up in clinic  10/1/2025  [] May return to full duty work                                     [] Return to work with restrictions:         Physician:  [] Dr. Padmini Alston  [] Dr. Rose Arango   [] Dr. Clarence Wu  [x] Eric Leblanc PA-C  [] Dr. Phan Wilkes  [] Dr. Theodore Dailey  [] Dr. Marlin Panda     Nurse Case Manger: KIRSTEN TRUJILLO RN        Wound Care Center Information: Should you experience any significant changes in your wound(s) or have questions about your wound care, please contact the Wound Center at [phone]. Our hours are Monday - Friday 8am - 4:30pm, closed all major holidays. If you need help with your wound outside these hours and cannot wait until we are again available, contact your PCP or go to the hospital emergency room.      PLEASE NOTE: IF YOU ARE UNABLE TO OBTAIN WOUND SUPPLIES, CONTINUE TO USE THE SUPPLIES YOU HAVE AVAILABLE UNTIL YOU ARE ABLE TO REACH US. IT IS MOST IMPORTANT TO KEEP THE WOUND COVERED AT ALL TIMES.

## 2025-07-22 NOTE — WOUND CARE
Wound Clinic Physician Orders and Discharge Instructions  [facility]  [address], [address]  [place] [address]  Telephone: [phone]     FAX [phone]    NAME:  Clarke Boyle  YOB: 1970  MEDICAL RECORD NUMBER:  [id number]  DATE:  7/22/2025      Return Appointment:    [] Dressing Supply Provider: Kasia  [] Home Healthcare:  [x] Return Appointment: 1 Week(s)  [] Nurse Visit:     [] Discharge from [facility]:   [] Healed        [] Refer to Provider:         [] Consult    Follow-up Information:    [] Ordered Tests:    [] Vascular/Arterial Testing   [] Please call [phone] to schedule at any [facility] facility      [] Imaging:     [] Please call [phone] to schedule at any [facility] facility.     [x] Referrals:    [x] Infectious Disease: DR. YOUNG   [] Vascular  [] Dermatology  [x] Other: Endocrinology 9/11      [] Rx to pharmacy:   [] Would Culture obtained in clinic  [] OR:    [] Nothing by mouth after midnight the night before the procedure  [] Other:    Wound Cleansing:   Do not scrub or use excessive force.  Cleanse wound prior to applying a clean dressing with:    [x] Normal Saline OR   [x] Mild Soap & Water     [] Keep Wound Dry in Shower  [] Wear a cast cover to shower  [] Other:       Topical Treatments:  Do not apply lotions, creams, or ointments to wound bed unless directed.     [] Apply moisturizing lotion to skin surrounding the wound prior to dressing change.  [] Apply thin film of moisture barrier ointment (Zinc) to skin immediately around wound.  [] Apply Betadine to skin immediately around wound   [] Apply Skin Prep to skin immediately around wound  [] Other:      Dressings:           Wound Location L Foot     [x] Apply Primary Dressing:       [] MediHoney Gel  [] Calcium Alginate with Silver   [] Calcium Alginate without silver   [x] Collagen with silver- 1ST [] Collagen without Silver    [] Santyl with moist saline gauze     [x] Hydrofera

## 2025-07-22 NOTE — PROGRESS NOTES
Have you been to the ER, urgent care clinic since your last visit?  Hospitalized since your last visit?   NO    Have you seen or consulted any other health care providers outside our system since your last visit?   NO    “Have you had a colorectal cancer screening such as a colonoscopy/FIT/Cologuard?    NO    No colonoscopy on file  No cologuard on file  No FIT/FOBT on file   No flexible sigmoidoscopy on file     “Have you had a diabetic eye exam?”    NO     Date of last diabetic eye exam: 5/25/2023     Chief Complaint   Patient presents with    Follow-up     Diabetic ulcer of left foot     /67 (BP Site: Left Upper Arm, Patient Position: Sitting, BP Cuff Size: Large Adult)   Pulse 79   Temp 97.4 °F (36.3 °C) (Temporal)   Resp 18   Ht 1.905 m (6' 3\")   Wt (!) 136.5 kg (301 lb)   SpO2 96%   BMI 37.62 kg/m²

## 2025-07-22 NOTE — PROGRESS NOTES
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 per MRI 06/2025  - Pt was seen as a new pt on 07/0, during which Group B strep was isolated from wound Cx   - IV antibiotics was rec d/t concerns of progression to necrotizing infection   - He is on a 6 wk course of Zosyn, initiated on 07/03, admits to tolerating w/o side effects  - Most recent labs on 07/14 revealed ESR 51 and CRP 2 both were 48 and 6 respectively on 07/08  - He reports left foot pain w prolonged standing/Ambulation, unable to perform regular duties at work   - Recently complete short disability paperwork for pt   - He continues to f/u w [facility] last seen earlier today    ROS  Review of Systems   Constitutional: Negative.    Respiratory: Negative.     Cardiovascular: Negative.    Gastrointestinal: Negative.    Genitourinary: Negative.    Musculoskeletal: Negative.    Skin:         Left lateral foot ulcer    Neurological: Negative.          Past Medical History:   Diagnosis Date    Diabetes insipidus     HTN (hypertension)     Hyperlipidemia     Type II or unspecified type diabetes mellitus without mention of complication, uncontrolled         Past Surgical History:   Procedure Laterality Date    AMPUTATION  Unknown    APPENDECTOMY  12/10/2018    CATARACT REMOVAL Left 08/2019    COLONOSCOPY      OTHER SURGICAL HISTORY      knee scope    TOE AMPUTATION  12/30/2020        Social History     Tobacco Use    Smoking status: Never    Smokeless tobacco: Never   Substance Use Topics    Alcohol use: Yes     Alcohol/week: 3.0 standard drinks of alcohol     Types: 3 Drinks containing 0.5 oz of alcohol per week     Comment: At social get togethers    Drug use: Not Currently     Types: Marijuana (Weed)        Family History   Problem Relation Age of Onset    Diabetes Other         son and aunts    Diabetes Sister     Cancer Brother         colon    Diabetes Mother     Cancer Mother         lung

## 2025-07-22 NOTE — PROGRESS NOTES
Types: Marijuana (Weed)       ALLERGIES    Allergies   Allergen Reactions    Shellfish Allergy Rash       MEDICATIONS    Current Outpatient Medications on File Prior to Encounter   Medication Sig Dispense Refill    Continuous Glucose Sensor (DEXCOM G7 SENSOR) MISC Change sensor every 10 days. Dx E11.65 9 each 3    piperacillin-tazobactam (ZOSYN) infusion Infuse 3.375 g intravenously in the morning and 3.375 g at noon and 3.375 g in the evening. Continue for 6 wks from start date, continuous infusion okay if easier to pt     CBC, BMP, CRP and ESR wkly while on IV antibiotics     Dx: Left DM foot ulcer/osteomyelitis. 304 g 1    NONFORMULARY Take by mouth daily Nitric oxide 3 tabs daily      atorvastatin (LIPITOR) 10 MG tablet TAKE 1 TABLET BY MOUTH EVERY DAY AT NIGHT 90 tablet 3    TRESIBA FLEXTOUCH 200 UNIT/ML SOPN INJECT 80 UNITS SUBCUTANEOUSLY AT NIGHT 45 mL 3    lisinopril-hydroCHLOROthiazide (PRINZIDE;ZESTORETIC) 20-25 MG per tablet TAKE 1 TABLET BY MOUTH DAILY 90 tablet 3    JARDIANCE 10 MG tablet Take 1 tablet by mouth daily 90 tablet 3    Dulaglutide (TRULICITY) 4.5 MG/0.5ML SOPN Inject 4.5 mg into the skin every 7 days 6 mL 3    Lancets MISC Use to test blood glucose 3 times daily. Dx code E11.65       No current facility-administered medications on file prior to encounter.         Written patient dismissal instructions given to patient and signed by patient or POA.         Electronically signed by Eric Leblanc PA-C on 7/22/2025 at 8:51 AM

## 2025-07-29 ENCOUNTER — HOSPITAL ENCOUNTER (OUTPATIENT)
Facility: HOSPITAL | Age: 55
Discharge: HOME | End: 2025-07-29
Attending: PHYSICIAN ASSISTANT
Payer: COMMERCIAL

## 2025-07-29 VITALS
SYSTOLIC BLOOD PRESSURE: 140 MMHG | DIASTOLIC BLOOD PRESSURE: 77 MMHG | RESPIRATION RATE: 18 BRPM | HEART RATE: 74 BPM | TEMPERATURE: 97.2 F

## 2025-07-29 DIAGNOSIS — L97.423 DIABETIC ULCER OF LEFT MIDFOOT ASSOCIATED WITH TYPE 2 DIABETES MELLITUS, WITH NECROSIS OF MUSCLE (HCC): Primary | ICD-10-CM

## 2025-07-29 DIAGNOSIS — E11.621 DIABETIC ULCER OF LEFT MIDFOOT ASSOCIATED WITH TYPE 2 DIABETES MELLITUS, WITH NECROSIS OF MUSCLE (HCC): Primary | ICD-10-CM

## 2025-07-29 PROCEDURE — 11042 DBRDMT SUBQ TIS 1ST 20SQCM/<: CPT

## 2025-07-29 RX ORDER — CLOBETASOL PROPIONATE 0.5 MG/G
OINTMENT TOPICAL PRN
OUTPATIENT
Start: 2025-07-29

## 2025-07-29 RX ORDER — BACITRACIN ZINC AND POLYMYXIN B SULFATE 500; 1000 [USP'U]/G; [USP'U]/G
OINTMENT TOPICAL PRN
OUTPATIENT
Start: 2025-07-29

## 2025-07-29 RX ORDER — LIDOCAINE HYDROCHLORIDE 20 MG/ML
JELLY TOPICAL PRN
OUTPATIENT
Start: 2025-07-29

## 2025-07-29 RX ORDER — MUPIROCIN 2 %
OINTMENT (GRAM) TOPICAL PRN
OUTPATIENT
Start: 2025-07-29

## 2025-07-29 RX ORDER — GENTAMICIN SULFATE 1 MG/G
OINTMENT TOPICAL PRN
OUTPATIENT
Start: 2025-07-29

## 2025-07-29 RX ORDER — BETAMETHASONE DIPROPIONATE 0.5 MG/G
CREAM TOPICAL PRN
OUTPATIENT
Start: 2025-07-29

## 2025-07-29 RX ORDER — GINSENG 100 MG
CAPSULE ORAL PRN
OUTPATIENT
Start: 2025-07-29

## 2025-07-29 RX ORDER — SILVER SULFADIAZINE 10 MG/G
CREAM TOPICAL PRN
OUTPATIENT
Start: 2025-07-29

## 2025-07-29 RX ORDER — SODIUM CHLOR/HYPOCHLOROUS ACID 0.033 %
SOLUTION, IRRIGATION IRRIGATION PRN
OUTPATIENT
Start: 2025-07-29

## 2025-07-29 RX ORDER — LIDOCAINE 50 MG/G
OINTMENT TOPICAL PRN
OUTPATIENT
Start: 2025-07-29

## 2025-07-29 RX ORDER — NEOMYCIN/BACITRACIN/POLYMYXINB 3.5-400-5K
OINTMENT (GRAM) TOPICAL PRN
OUTPATIENT
Start: 2025-07-29

## 2025-07-29 RX ORDER — TRIAMCINOLONE ACETONIDE 1 MG/G
OINTMENT TOPICAL PRN
OUTPATIENT
Start: 2025-07-29

## 2025-07-29 RX ORDER — LIDOCAINE 40 MG/G
CREAM TOPICAL PRN
OUTPATIENT
Start: 2025-07-29

## 2025-07-29 RX ORDER — LIDOCAINE HYDROCHLORIDE 40 MG/ML
SOLUTION TOPICAL PRN
OUTPATIENT
Start: 2025-07-29

## 2025-07-29 NOTE — DISCHARGE INSTRUCTIONS
Alessia Bueno RN  Registered Nurse     Wound Care     Signed     Date of Service: 7/29/2025 10:30 AM     Signed                          Wound Clinic Physician Orders and Discharge Instructions  [facility]  [facility] [address], [address]  [place] [id number]  Telephone: [phone]     FAX [phone]     NAME:  Clarke Boyle  YOB: 1970  MEDICAL RECORD NUMBER:  [id number]  DATE:  7/29/2025        Return Appointment:     [] Dressing Supply Provider: Kasia  [] Home Healthcare:  [x] Return Appointment: 1 Week(s)  [] Nurse Visit:      [] Discharge from [facility]:   [] Healed        [] Refer to Provider:         [] Consult     Follow-up Information:     [] Ordered Tests:    [] Vascular/Arterial Testing    [] Please call [phone] to schedule at any [facility] facility       [] Imaging:                           [] Please call [phone] to schedule at any [facility] facility.      [x] Referrals:    [x] Infectious Disease: DR. YOUNG 8/12/2025 AT 1:00 PM     [x] Vascular- [facility]- DR HYDE- KEEP FOLLOW UP ON 8/15/2025          [] Dermatology                      [x] Other: Endocrinology -Nay Burgess MD, 9/11/2025 AT 8:30 AM         [] Rx to pharmacy:   [] Would Culture obtained in clinic  [] OR:                          [] Nothing by mouth after midnight the night before the procedure  [] Other:     Wound Cleansing:   Do not scrub or use excessive force.  Cleanse wound prior to applying a clean dressing with:     [x] Normal Saline OR    [x] Mild Soap & Water     [] Keep Wound Dry in Shower  [] Wear a cast cover to shower  [] Other:        Topical Treatments:  Do not apply lotions, creams, or ointments to wound bed unless directed.      [] Apply moisturizing lotion to skin surrounding the wound prior to dressing change.  [] Apply thin film of moisture barrier ointment (Zinc) to skin immediately around wound.  [] Apply Betadine to skin immediately around wound   [] Apply Skin

## 2025-07-29 NOTE — WOUND CARE
Wound Clinic Physician Orders and Discharge Instructions  [facility]  [address], [address]  [place] [address]  Telephone: [phone]     FAX [phone]    NAME:  Clarke Boyle  YOB: 1970  MEDICAL RECORD NUMBER:  [id number]  DATE:  7/29/2025      Return Appointment:    [] Dressing Supply Provider: Kasia  [] Home Healthcare:  [x] Return Appointment: 1 Week(s)  [] Nurse Visit:     [] Discharge from [facility]:   [] Healed        [] Refer to Provider:         [] Consult    Follow-up Information:    [] Ordered Tests:    [] Vascular/Arterial Testing   [] Please call [phone] to schedule at any [facility] facility      [] Imaging:     [] Please call [phone] to schedule at any [facility] facility.     [x] Referrals:    [x] Infectious Disease: DR. YOUNG 8/12/2025 AT 1:00 PM   [x] Vascular- [facility]- DR HYDE- KEEP FOLLOW UP ON 8/15/2025 [] Dermatology  [x] Other: Endocrinology -Muthykyra, Nay Monsivais MD, 9/11/2025 AT 8:30 AM       [] Rx to pharmacy:   [] Would Culture obtained in clinic  [] OR:    [] Nothing by mouth after midnight the night before the procedure  [] Other:    Wound Cleansing:   Do not scrub or use excessive force.  Cleanse wound prior to applying a clean dressing with:    [x] Normal Saline OR   [x] Mild Soap & Water     [] Keep Wound Dry in Shower  [] Wear a cast cover to shower  [] Other:       Topical Treatments:  Do not apply lotions, creams, or ointments to wound bed unless directed.     [] Apply moisturizing lotion to skin surrounding the wound prior to dressing change.  [] Apply thin film of moisture barrier ointment (Zinc) to skin immediately around wound.  [] Apply Betadine to skin immediately around wound   [] Apply Skin Prep to skin immediately around wound  [] Other:      Dressings:           Wound Location L Foot     [x] Apply Primary Dressing:       [] MediHoney Gel  [] Calcium Alginate with Silver   [] Calcium Alginate without silver   [x]

## 2025-07-29 NOTE — PROGRESS NOTES
[facility]   Wound Care and [facility] Oxygen Therapy Center   Medical Staff Progress Note     Clarke Boyle  MEDICAL RECORD NUMBER:  [id number]  AGE: 54 y.o.   GENDER: male  : 1970  EPISODE DATE:  2025    Chief complaint and reason for visit:     Chief Complaint   Patient presents with    Wound Check     L Foot      Patient presenting for follow up evaluation of wound(s) per chief complaint.      Subjective and ROS: Symptoms, wound related issues, or other pertinent wound history since last visit: no new wound care issues. Tolerating current treatment. No systemic complaints above baseline.    History of Wound Context: Per original history and physical on this patient. Changes in history since last evaluation: none    Medical Decision Making:     Problem List Items Addressed This Visit          Endocrine    Diabetic ulcer of left midfoot associated with type 2 diabetes mellitus, with necrosis of muscle (HCC) - Primary    Relevant Medications    lidocaine 4 % jelly    Other Relevant Orders    MD COMMUNICATION 1    MD COMMUNICATION 2    Initiate Outpatient Wound Care Protocol    Initiate Oxygen Therapy Protocol       Wounds and Treatment Plan:  Wound is getting shallower.  Wound bed clean.  Debrided, continue same dressing.  RTC 1wk    Other associated diagnoses or problems addressed:  none    Pertinent imaging reviewed including independent interpretation include:  None    Pertinent labs reviewed.   Medical records and review of external note (s) from other providers done as well.    New lab or imaging orders placed:  None     Prescription drug management: N/A     Discussion of management or test interpretation with N/A.    Comorbid conditions affecting wound healing: As per PMH which was reviewed.    Risk of complications and/or mortality of patient management:  This patient has a minimal risk of morbidity and mortality from additional diagnostic testing or treatment. This is due

## 2025-08-05 ENCOUNTER — HOSPITAL ENCOUNTER (OUTPATIENT)
Facility: HOSPITAL | Age: 55
Discharge: HOME | End: 2025-08-05
Attending: PHYSICIAN ASSISTANT
Payer: COMMERCIAL

## 2025-08-05 VITALS
HEART RATE: 76 BPM | RESPIRATION RATE: 18 BRPM | TEMPERATURE: 97.2 F | SYSTOLIC BLOOD PRESSURE: 145 MMHG | DIASTOLIC BLOOD PRESSURE: 66 MMHG

## 2025-08-05 DIAGNOSIS — E11.621 DIABETIC ULCER OF LEFT MIDFOOT ASSOCIATED WITH TYPE 2 DIABETES MELLITUS, WITH NECROSIS OF MUSCLE (HCC): Primary | ICD-10-CM

## 2025-08-05 DIAGNOSIS — L97.423 DIABETIC ULCER OF LEFT MIDFOOT ASSOCIATED WITH TYPE 2 DIABETES MELLITUS, WITH NECROSIS OF MUSCLE (HCC): Primary | ICD-10-CM

## 2025-08-05 LAB
BASOPHILS ABSOLUTE: NORMAL
BASOPHILS RELATIVE PERCENT: NORMAL
BUN BLDV-MCNC: 28 MG/DL
CALCIUM SERPL-MCNC: 9.4 MG/DL
CHLORIDE BLD-SCNC: 103 MMOL/L
CO2: 20 MMOL/L
CREAT SERPL-MCNC: 1.35 MG/DL
EGFR: 62
EOSINOPHILS ABSOLUTE: NORMAL
EOSINOPHILS RELATIVE PERCENT: NORMAL
GLUCOSE BLD-MCNC: 123 MG/DL
HCT VFR BLD CALC: 44.4 % (ref 41–53)
HEMOGLOBIN: 14 G/DL (ref 13.5–17.5)
LYMPHOCYTES ABSOLUTE: NORMAL
LYMPHOCYTES RELATIVE PERCENT: NORMAL
MCH RBC QN AUTO: 28.3 PG
MCHC RBC AUTO-ENTMCNC: 31.5 G/DL
MCV RBC AUTO: 90 FL
MONOCYTES ABSOLUTE: NORMAL
MONOCYTES RELATIVE PERCENT: NORMAL
NEUTROPHILS ABSOLUTE: NORMAL
NEUTROPHILS RELATIVE PERCENT: NORMAL
PLATELET # BLD: 228 K/ΜL
PMV BLD AUTO: NORMAL FL
POTASSIUM SERPL-SCNC: 4 MMOL/L
RBC # BLD: 4.95 10^6/ΜL
SODIUM BLD-SCNC: 141 MMOL/L
WBC # BLD: 8.2 10^3/ML

## 2025-08-05 PROCEDURE — 11042 DBRDMT SUBQ TIS 1ST 20SQCM/<: CPT

## 2025-08-05 RX ORDER — NEOMYCIN/BACITRACIN/POLYMYXINB 3.5-400-5K
OINTMENT (GRAM) TOPICAL PRN
OUTPATIENT
Start: 2025-08-05

## 2025-08-05 RX ORDER — LIDOCAINE 50 MG/G
OINTMENT TOPICAL PRN
OUTPATIENT
Start: 2025-08-05

## 2025-08-05 RX ORDER — TRIAMCINOLONE ACETONIDE 1 MG/G
OINTMENT TOPICAL PRN
OUTPATIENT
Start: 2025-08-05

## 2025-08-05 RX ORDER — LIDOCAINE HYDROCHLORIDE 40 MG/ML
SOLUTION TOPICAL PRN
OUTPATIENT
Start: 2025-08-05

## 2025-08-05 RX ORDER — LIDOCAINE 40 MG/G
CREAM TOPICAL PRN
OUTPATIENT
Start: 2025-08-05

## 2025-08-05 RX ORDER — BACITRACIN ZINC AND POLYMYXIN B SULFATE 500; 1000 [USP'U]/G; [USP'U]/G
OINTMENT TOPICAL PRN
OUTPATIENT
Start: 2025-08-05

## 2025-08-05 RX ORDER — GENTAMICIN SULFATE 1 MG/G
OINTMENT TOPICAL PRN
OUTPATIENT
Start: 2025-08-05

## 2025-08-05 RX ORDER — GINSENG 100 MG
CAPSULE ORAL PRN
OUTPATIENT
Start: 2025-08-05

## 2025-08-05 RX ORDER — MUPIROCIN 2 %
OINTMENT (GRAM) TOPICAL PRN
OUTPATIENT
Start: 2025-08-05

## 2025-08-05 RX ORDER — SODIUM CHLOR/HYPOCHLOROUS ACID 0.033 %
SOLUTION, IRRIGATION IRRIGATION PRN
OUTPATIENT
Start: 2025-08-05

## 2025-08-05 RX ORDER — LIDOCAINE HYDROCHLORIDE 20 MG/ML
JELLY TOPICAL PRN
OUTPATIENT
Start: 2025-08-05

## 2025-08-05 RX ORDER — SILVER SULFADIAZINE 10 MG/G
CREAM TOPICAL PRN
OUTPATIENT
Start: 2025-08-05

## 2025-08-05 RX ORDER — BETAMETHASONE DIPROPIONATE 0.5 MG/G
CREAM TOPICAL PRN
OUTPATIENT
Start: 2025-08-05

## 2025-08-05 RX ORDER — CLOBETASOL PROPIONATE 0.5 MG/G
OINTMENT TOPICAL PRN
OUTPATIENT
Start: 2025-08-05

## 2025-08-11 ENCOUNTER — FOLLOWUP TELEPHONE ENCOUNTER (OUTPATIENT)
Facility: HOSPITAL | Age: 55
End: 2025-08-11

## 2025-08-12 ENCOUNTER — OFFICE VISIT (OUTPATIENT)
Age: 55
End: 2025-08-12
Payer: COMMERCIAL

## 2025-08-12 VITALS
DIASTOLIC BLOOD PRESSURE: 77 MMHG | BODY MASS INDEX: 37.72 KG/M2 | TEMPERATURE: 97.7 F | HEIGHT: 75 IN | WEIGHT: 303.4 LBS | SYSTOLIC BLOOD PRESSURE: 120 MMHG | RESPIRATION RATE: 18 BRPM | OXYGEN SATURATION: 96 % | HEART RATE: 74 BPM

## 2025-08-12 DIAGNOSIS — E11.42 DIABETIC POLYNEUROPATHY ASSOCIATED WITH TYPE 2 DIABETES MELLITUS (HCC): ICD-10-CM

## 2025-08-12 DIAGNOSIS — M86.672: ICD-10-CM

## 2025-08-12 DIAGNOSIS — I73.9 PAD (PERIPHERAL ARTERY DISEASE): ICD-10-CM

## 2025-08-12 DIAGNOSIS — L97.524 CHRONIC ULCER OF LEFT FOOT WITH NECROSIS OF BONE (HCC): Primary | ICD-10-CM

## 2025-08-12 PROCEDURE — 3078F DIAST BP <80 MM HG: CPT | Performed by: INTERNAL MEDICINE

## 2025-08-12 PROCEDURE — 3044F HG A1C LEVEL LT 7.0%: CPT | Performed by: INTERNAL MEDICINE

## 2025-08-12 PROCEDURE — 99214 OFFICE O/P EST MOD 30 MIN: CPT | Performed by: INTERNAL MEDICINE

## 2025-08-12 PROCEDURE — 3074F SYST BP LT 130 MM HG: CPT | Performed by: INTERNAL MEDICINE

## 2025-08-12 RX ORDER — DICLOFENAC SODIUM 75 MG/1
75 TABLET, DELAYED RELEASE ORAL 2 TIMES DAILY
COMMUNITY

## 2025-08-12 ASSESSMENT — ENCOUNTER SYMPTOMS
GASTROINTESTINAL NEGATIVE: 1
RESPIRATORY NEGATIVE: 1

## 2025-08-13 DIAGNOSIS — Z79.4 TYPE 2 DIABETES MELLITUS WITH HYPERGLYCEMIA, WITH LONG-TERM CURRENT USE OF INSULIN (HCC): ICD-10-CM

## 2025-08-13 DIAGNOSIS — E11.65 TYPE 2 DIABETES MELLITUS WITH HYPERGLYCEMIA, WITH LONG-TERM CURRENT USE OF INSULIN (HCC): ICD-10-CM

## 2025-08-13 RX ORDER — EMPAGLIFLOZIN 10 MG/1
10 TABLET, FILM COATED ORAL DAILY
Qty: 90 TABLET | Refills: 3 | Status: ACTIVE | OUTPATIENT
Start: 2025-08-13

## 2025-08-19 ENCOUNTER — HOSPITAL ENCOUNTER (OUTPATIENT)
Facility: HOSPITAL | Age: 55
Discharge: HOME | End: 2025-08-19
Attending: PHYSICIAN ASSISTANT
Payer: COMMERCIAL

## 2025-08-19 VITALS
SYSTOLIC BLOOD PRESSURE: 138 MMHG | RESPIRATION RATE: 18 BRPM | HEART RATE: 78 BPM | DIASTOLIC BLOOD PRESSURE: 80 MMHG | TEMPERATURE: 97.2 F

## 2025-08-19 DIAGNOSIS — L97.423 DIABETIC ULCER OF LEFT MIDFOOT ASSOCIATED WITH TYPE 2 DIABETES MELLITUS, WITH NECROSIS OF MUSCLE (HCC): Primary | ICD-10-CM

## 2025-08-19 DIAGNOSIS — E11.621 DIABETIC ULCER OF LEFT MIDFOOT ASSOCIATED WITH TYPE 2 DIABETES MELLITUS, WITH NECROSIS OF MUSCLE (HCC): Primary | ICD-10-CM

## 2025-08-19 PROCEDURE — 87070 CULTURE OTHR SPECIMN AEROBIC: CPT

## 2025-08-19 PROCEDURE — 87205 SMEAR GRAM STAIN: CPT

## 2025-08-19 PROCEDURE — 87077 CULTURE AEROBIC IDENTIFY: CPT

## 2025-08-19 PROCEDURE — 87186 SC STD MICRODIL/AGAR DIL: CPT

## 2025-08-19 PROCEDURE — 11042 DBRDMT SUBQ TIS 1ST 20SQCM/<: CPT

## 2025-08-19 RX ORDER — BACITRACIN ZINC AND POLYMYXIN B SULFATE 500; 1000 [USP'U]/G; [USP'U]/G
OINTMENT TOPICAL PRN
OUTPATIENT
Start: 2025-08-19

## 2025-08-19 RX ORDER — LIDOCAINE 50 MG/G
OINTMENT TOPICAL PRN
OUTPATIENT
Start: 2025-08-19

## 2025-08-19 RX ORDER — NEOMYCIN/BACITRACIN/POLYMYXINB 3.5-400-5K
OINTMENT (GRAM) TOPICAL PRN
OUTPATIENT
Start: 2025-08-19

## 2025-08-19 RX ORDER — SILVER SULFADIAZINE 10 MG/G
CREAM TOPICAL PRN
OUTPATIENT
Start: 2025-08-19

## 2025-08-19 RX ORDER — GINSENG 100 MG
CAPSULE ORAL PRN
OUTPATIENT
Start: 2025-08-19

## 2025-08-19 RX ORDER — LIDOCAINE HYDROCHLORIDE 40 MG/ML
SOLUTION TOPICAL PRN
OUTPATIENT
Start: 2025-08-19

## 2025-08-19 RX ORDER — LIDOCAINE 40 MG/G
CREAM TOPICAL PRN
OUTPATIENT
Start: 2025-08-19

## 2025-08-19 RX ORDER — TRIAMCINOLONE ACETONIDE 1 MG/G
OINTMENT TOPICAL PRN
OUTPATIENT
Start: 2025-08-19

## 2025-08-19 RX ORDER — BETAMETHASONE DIPROPIONATE 0.5 MG/G
CREAM TOPICAL PRN
OUTPATIENT
Start: 2025-08-19

## 2025-08-19 RX ORDER — SODIUM CHLOR/HYPOCHLOROUS ACID 0.033 %
SOLUTION, IRRIGATION IRRIGATION PRN
OUTPATIENT
Start: 2025-08-19

## 2025-08-19 RX ORDER — CLOBETASOL PROPIONATE 0.5 MG/G
OINTMENT TOPICAL PRN
OUTPATIENT
Start: 2025-08-19

## 2025-08-19 RX ORDER — GENTAMICIN SULFATE 1 MG/G
OINTMENT TOPICAL PRN
OUTPATIENT
Start: 2025-08-19

## 2025-08-19 RX ORDER — MUPIROCIN 2 %
OINTMENT (GRAM) TOPICAL PRN
OUTPATIENT
Start: 2025-08-19

## 2025-08-19 RX ORDER — LIDOCAINE HYDROCHLORIDE 20 MG/ML
JELLY TOPICAL PRN
OUTPATIENT
Start: 2025-08-19

## 2025-08-19 ASSESSMENT — PAIN SCALES - GENERAL: PAINLEVEL_OUTOF10: 0

## 2025-08-23 LAB
BACTERIA SPEC CULT: ABNORMAL
GRAM STN SPEC: ABNORMAL
GRAM STN SPEC: ABNORMAL
Lab: ABNORMAL

## 2025-08-25 ENCOUNTER — TELEPHONE (OUTPATIENT)
Age: 55
End: 2025-08-25

## 2025-08-25 DIAGNOSIS — L97.523 CHRONIC FOOT ULCER, LEFT, WITH NECROSIS OF MUSCLE (HCC): Primary | ICD-10-CM

## 2025-08-25 RX ORDER — LEVOFLOXACIN 750 MG/1
750 TABLET, FILM COATED ORAL DAILY
Qty: 14 TABLET | Refills: 0 | Status: SHIPPED | OUTPATIENT
Start: 2025-08-25 | End: 2025-09-08

## 2025-08-25 RX ORDER — SULFAMETHOXAZOLE AND TRIMETHOPRIM 800; 160 MG/1; MG/1
1 TABLET ORAL 2 TIMES DAILY
Qty: 28 TABLET | Refills: 0 | Status: SHIPPED | OUTPATIENT
Start: 2025-08-25 | End: 2025-09-08

## 2025-08-26 ENCOUNTER — HOSPITAL ENCOUNTER (OUTPATIENT)
Facility: HOSPITAL | Age: 55
Discharge: HOME | End: 2025-08-26
Attending: PHYSICIAN ASSISTANT
Payer: COMMERCIAL

## 2025-08-26 VITALS
SYSTOLIC BLOOD PRESSURE: 126 MMHG | DIASTOLIC BLOOD PRESSURE: 69 MMHG | HEART RATE: 76 BPM | RESPIRATION RATE: 18 BRPM | TEMPERATURE: 97.5 F

## 2025-08-26 DIAGNOSIS — E11.621 DIABETIC ULCER OF LEFT MIDFOOT ASSOCIATED WITH TYPE 2 DIABETES MELLITUS, WITH NECROSIS OF MUSCLE (HCC): Primary | ICD-10-CM

## 2025-08-26 DIAGNOSIS — L97.423 DIABETIC ULCER OF LEFT MIDFOOT ASSOCIATED WITH TYPE 2 DIABETES MELLITUS, WITH NECROSIS OF MUSCLE (HCC): Primary | ICD-10-CM

## 2025-08-26 PROCEDURE — 11042 DBRDMT SUBQ TIS 1ST 20SQCM/<: CPT

## 2025-08-26 RX ORDER — SILVER SULFADIAZINE 10 MG/G
CREAM TOPICAL PRN
OUTPATIENT
Start: 2025-08-26

## 2025-08-26 RX ORDER — LIDOCAINE 50 MG/G
OINTMENT TOPICAL PRN
OUTPATIENT
Start: 2025-08-26

## 2025-08-26 RX ORDER — NEOMYCIN/BACITRACIN/POLYMYXINB 3.5-400-5K
OINTMENT (GRAM) TOPICAL PRN
OUTPATIENT
Start: 2025-08-26

## 2025-08-26 RX ORDER — BACITRACIN ZINC AND POLYMYXIN B SULFATE 500; 1000 [USP'U]/G; [USP'U]/G
OINTMENT TOPICAL PRN
OUTPATIENT
Start: 2025-08-26

## 2025-08-26 RX ORDER — MUPIROCIN 2 %
OINTMENT (GRAM) TOPICAL PRN
OUTPATIENT
Start: 2025-08-26

## 2025-08-26 RX ORDER — GINSENG 100 MG
CAPSULE ORAL PRN
OUTPATIENT
Start: 2025-08-26

## 2025-08-26 RX ORDER — LIDOCAINE 40 MG/G
CREAM TOPICAL PRN
OUTPATIENT
Start: 2025-08-26

## 2025-08-26 RX ORDER — LIDOCAINE HYDROCHLORIDE 20 MG/ML
JELLY TOPICAL PRN
OUTPATIENT
Start: 2025-08-26

## 2025-08-26 RX ORDER — BETAMETHASONE DIPROPIONATE 0.5 MG/G
CREAM TOPICAL PRN
OUTPATIENT
Start: 2025-08-26

## 2025-08-26 RX ORDER — CLOBETASOL PROPIONATE 0.5 MG/G
OINTMENT TOPICAL PRN
OUTPATIENT
Start: 2025-08-26

## 2025-08-26 RX ORDER — TRIAMCINOLONE ACETONIDE 1 MG/G
OINTMENT TOPICAL PRN
OUTPATIENT
Start: 2025-08-26

## 2025-08-26 RX ORDER — LIDOCAINE HYDROCHLORIDE 40 MG/ML
SOLUTION TOPICAL PRN
OUTPATIENT
Start: 2025-08-26

## 2025-08-26 RX ORDER — SODIUM CHLOR/HYPOCHLOROUS ACID 0.033 %
SOLUTION, IRRIGATION IRRIGATION PRN
OUTPATIENT
Start: 2025-08-26

## 2025-08-26 RX ORDER — GENTAMICIN SULFATE 1 MG/G
OINTMENT TOPICAL PRN
OUTPATIENT
Start: 2025-08-26

## 2025-08-26 ASSESSMENT — PAIN SCALES - GENERAL: PAINLEVEL_OUTOF10: 0

## 2025-09-02 ENCOUNTER — TELEPHONE (OUTPATIENT)
Age: 55
End: 2025-09-02

## 2025-09-02 ENCOUNTER — HOSPITAL ENCOUNTER (OUTPATIENT)
Facility: HOSPITAL | Age: 55
Discharge: HOME | End: 2025-09-02
Attending: PHYSICIAN ASSISTANT
Payer: COMMERCIAL

## 2025-09-02 VITALS
HEART RATE: 81 BPM | DIASTOLIC BLOOD PRESSURE: 78 MMHG | RESPIRATION RATE: 18 BRPM | SYSTOLIC BLOOD PRESSURE: 135 MMHG | TEMPERATURE: 97.3 F

## 2025-09-02 DIAGNOSIS — E11.621 DIABETIC ULCER OF LEFT MIDFOOT ASSOCIATED WITH TYPE 2 DIABETES MELLITUS, WITH NECROSIS OF MUSCLE (HCC): Primary | ICD-10-CM

## 2025-09-02 DIAGNOSIS — L97.423 DIABETIC ULCER OF LEFT MIDFOOT ASSOCIATED WITH TYPE 2 DIABETES MELLITUS, WITH NECROSIS OF MUSCLE (HCC): Primary | ICD-10-CM

## 2025-09-02 PROCEDURE — 11042 DBRDMT SUBQ TIS 1ST 20SQCM/<: CPT

## 2025-09-02 RX ORDER — LIDOCAINE HYDROCHLORIDE 20 MG/ML
JELLY TOPICAL PRN
OUTPATIENT
Start: 2025-09-02

## 2025-09-02 RX ORDER — NEOMYCIN/BACITRACIN/POLYMYXINB 3.5-400-5K
OINTMENT (GRAM) TOPICAL PRN
OUTPATIENT
Start: 2025-09-02

## 2025-09-02 RX ORDER — SODIUM CHLOR/HYPOCHLOROUS ACID 0.033 %
SOLUTION, IRRIGATION IRRIGATION PRN
OUTPATIENT
Start: 2025-09-02

## 2025-09-02 RX ORDER — SILVER SULFADIAZINE 10 MG/G
CREAM TOPICAL PRN
OUTPATIENT
Start: 2025-09-02

## 2025-09-02 RX ORDER — LIDOCAINE 40 MG/G
CREAM TOPICAL PRN
OUTPATIENT
Start: 2025-09-02

## 2025-09-02 RX ORDER — BETAMETHASONE DIPROPIONATE 0.5 MG/G
CREAM TOPICAL PRN
OUTPATIENT
Start: 2025-09-02

## 2025-09-02 RX ORDER — TRIAMCINOLONE ACETONIDE 1 MG/G
OINTMENT TOPICAL PRN
OUTPATIENT
Start: 2025-09-02

## 2025-09-02 RX ORDER — CLOBETASOL PROPIONATE 0.5 MG/G
OINTMENT TOPICAL PRN
OUTPATIENT
Start: 2025-09-02

## 2025-09-02 RX ORDER — MUPIROCIN 2 %
OINTMENT (GRAM) TOPICAL PRN
OUTPATIENT
Start: 2025-09-02

## 2025-09-02 RX ORDER — LIDOCAINE 50 MG/G
OINTMENT TOPICAL PRN
OUTPATIENT
Start: 2025-09-02

## 2025-09-02 RX ORDER — LIDOCAINE HYDROCHLORIDE 40 MG/ML
SOLUTION TOPICAL PRN
OUTPATIENT
Start: 2025-09-02

## 2025-09-02 RX ORDER — GINSENG 100 MG
CAPSULE ORAL PRN
OUTPATIENT
Start: 2025-09-02

## 2025-09-02 RX ORDER — BACITRACIN ZINC AND POLYMYXIN B SULFATE 500; 1000 [USP'U]/G; [USP'U]/G
OINTMENT TOPICAL PRN
OUTPATIENT
Start: 2025-09-02

## 2025-09-02 RX ORDER — GENTAMICIN SULFATE 1 MG/G
OINTMENT TOPICAL PRN
OUTPATIENT
Start: 2025-09-02

## 2025-09-02 ASSESSMENT — PAIN SCALES - GENERAL: PAINLEVEL_OUTOF10: 0
